# Patient Record
Sex: FEMALE | Race: WHITE | NOT HISPANIC OR LATINO | Employment: OTHER | URBAN - METROPOLITAN AREA
[De-identification: names, ages, dates, MRNs, and addresses within clinical notes are randomized per-mention and may not be internally consistent; named-entity substitution may affect disease eponyms.]

---

## 2023-05-26 DIAGNOSIS — I10 ESSENTIAL (PRIMARY) HYPERTENSION: ICD-10-CM

## 2023-05-26 PROBLEM — Z86.0100 HISTORY OF COLON POLYPS: Status: ACTIVE | Noted: 2023-05-26

## 2023-05-26 PROBLEM — E78.2 COMBINED HYPERLIPIDEMIA: Status: ACTIVE | Noted: 2023-05-26

## 2023-05-26 PROBLEM — H65.02 ACUTE SEROUS OTITIS MEDIA OF LEFT EAR: Status: RESOLVED | Noted: 2023-05-26 | Resolved: 2023-05-26

## 2023-05-26 PROBLEM — Z86.010 HISTORY OF COLON POLYPS: Status: ACTIVE | Noted: 2023-05-26

## 2023-05-26 PROBLEM — T78.2XXA ANAPHYLAXIS DUE TO INSECT VENOM: Status: ACTIVE | Noted: 2023-05-26

## 2023-05-26 PROBLEM — J02.9 SORE THROAT: Status: RESOLVED | Noted: 2023-05-26 | Resolved: 2023-05-26

## 2023-05-26 PROBLEM — T63.481A ANAPHYLAXIS DUE TO INSECT VENOM: Status: ACTIVE | Noted: 2023-05-26

## 2023-05-26 PROBLEM — R73.01 IMPAIRED FASTING GLUCOSE: Status: ACTIVE | Noted: 2023-05-26

## 2023-05-26 RX ORDER — LISINOPRIL AND HYDROCHLOROTHIAZIDE 10; 12.5 MG/1; MG/1
TABLET ORAL
Qty: 90 TABLET | Refills: 1 | Status: SHIPPED | OUTPATIENT
Start: 2023-05-26 | End: 2023-11-27

## 2023-05-26 RX ORDER — LATANOPROST 50 UG/ML
SOLUTION OPHTHALMIC
COMMUNITY

## 2023-05-26 RX ORDER — TIMOLOL MALEATE 5 MG/ML
SOLUTION/ DROPS OPHTHALMIC
COMMUNITY

## 2023-05-26 RX ORDER — LISINOPRIL 10 MG/1
10 TABLET ORAL DAILY
COMMUNITY
End: 2023-10-31

## 2023-05-26 RX ORDER — ASCORBIC ACID 500 MG
500 TABLET,CHEWABLE ORAL DAILY
COMMUNITY
Start: 2021-10-19

## 2023-05-26 RX ORDER — VIT C/E/ZN/COPPR/LUTEIN/ZEAXAN 250MG-90MG
25 CAPSULE ORAL DAILY
COMMUNITY
Start: 2021-10-19

## 2023-05-26 RX ORDER — LISINOPRIL AND HYDROCHLOROTHIAZIDE 10; 12.5 MG/1; MG/1
1 TABLET ORAL DAILY
COMMUNITY

## 2023-05-26 RX ORDER — SIMVASTATIN 40 MG/1
40 TABLET, FILM COATED ORAL DAILY
COMMUNITY
End: 2023-11-27

## 2023-05-26 RX ORDER — FLAXSEED OIL 1000 MG
1 CAPSULE ORAL DAILY
COMMUNITY

## 2023-06-26 ENCOUNTER — TELEPHONE (OUTPATIENT)
Dept: PRIMARY CARE | Facility: CLINIC | Age: 73
End: 2023-06-26
Payer: MEDICARE

## 2023-06-26 DIAGNOSIS — Z12.31 ENCOUNTER FOR SCREENING MAMMOGRAM FOR MALIGNANT NEOPLASM OF BREAST: ICD-10-CM

## 2023-06-26 NOTE — TELEPHONE ENCOUNTER
Pt is asking for an order for a Mammogram. Pt would like to  at office at the end of this week. Please call pt when ready.

## 2023-08-15 ENCOUNTER — OFFICE VISIT (OUTPATIENT)
Dept: PRIMARY CARE | Facility: CLINIC | Age: 73
End: 2023-08-15
Payer: MEDICARE

## 2023-08-15 VITALS
DIASTOLIC BLOOD PRESSURE: 78 MMHG | HEART RATE: 75 BPM | SYSTOLIC BLOOD PRESSURE: 118 MMHG | OXYGEN SATURATION: 96 % | BODY MASS INDEX: 21.67 KG/M2 | WEIGHT: 126.25 LBS

## 2023-08-15 DIAGNOSIS — I10 ESSENTIAL HYPERTENSION: ICD-10-CM

## 2023-08-15 DIAGNOSIS — L25.5 RHUS DERMATITIS: Primary | ICD-10-CM

## 2023-08-15 PROCEDURE — 1159F MED LIST DOCD IN RCRD: CPT | Performed by: FAMILY MEDICINE

## 2023-08-15 PROCEDURE — 1036F TOBACCO NON-USER: CPT | Performed by: FAMILY MEDICINE

## 2023-08-15 PROCEDURE — 3078F DIAST BP <80 MM HG: CPT | Performed by: FAMILY MEDICINE

## 2023-08-15 PROCEDURE — 3074F SYST BP LT 130 MM HG: CPT | Performed by: FAMILY MEDICINE

## 2023-08-15 PROCEDURE — 1160F RVW MEDS BY RX/DR IN RCRD: CPT | Performed by: FAMILY MEDICINE

## 2023-08-15 PROCEDURE — 99213 OFFICE O/P EST LOW 20 MIN: CPT | Performed by: FAMILY MEDICINE

## 2023-08-15 RX ORDER — PREDNISONE 10 MG/1
TABLET ORAL
Qty: 14 TABLET | Refills: 0 | Status: SHIPPED | OUTPATIENT
Start: 2023-08-15 | End: 2023-08-23

## 2023-08-15 ASSESSMENT — ENCOUNTER SYMPTOMS
APPETITE CHANGE: 0
UNEXPECTED WEIGHT CHANGE: 0
NAUSEA: 0

## 2023-08-15 NOTE — PROGRESS NOTES
Subjective   Patient ID: Krupa Ramirez is a 73 y.o. female who presents for Poison Ivy (Pt presentsw c/o poison ivy face, arm L and R wrist and finger x 2 days.BL).    Poison Serena       Has blisters and poison ivy rash over arms forearms and face but also has a swollen lip  Did take some Benadryl and applied hydrocortisone yesterday but it did not do much  Review of Systems   Constitutional:  Negative for appetite change and unexpected weight change.   Eyes:  Negative for visual disturbance.   Gastrointestinal:  Negative for nausea.       Objective   /78   Pulse 75   Wt 57.3 kg (126 lb 4 oz)   LMP  (LMP Unknown)   SpO2 96%   BMI 21.67 kg/m²     Physical Exam  HENT:      Head: Normocephalic and atraumatic.      Nose: Nose normal.      Mouth/Throat:      Mouth: Mucous membranes are moist.      Pharynx: No oropharyngeal exudate.   Eyes:      Extraocular Movements: Extraocular movements intact.      Conjunctiva/sclera: Conjunctivae normal.      Pupils: Pupils are equal, round, and reactive to light.   Cardiovascular:      Rate and Rhythm: Normal rate and regular rhythm.   Pulmonary:      Effort: Pulmonary effort is normal.   Abdominal:      General: There is no distension.      Palpations: Abdomen is soft.   Musculoskeletal:      Cervical back: Normal range of motion and neck supple.   Lymphadenopathy:      Cervical: No cervical adenopathy.   Neurological:      General: No focal deficit present.      Mental Status: She is alert.   Psychiatric:         Attention and Perception: Attention normal.         Speech: Speech normal.         Behavior: Behavior is cooperative.     Integumentary: Multiple areas of linear erythematous pruritic papules and vesicles over forearms arms and right face  The upper lip is quite swollen the lower lip less so but there is some poison ivy right on the vermilion border    Assessment/Plan   Diagnoses and all orders for this visit:  Rhus dermatitis  Comments:  Avoidance and local care  discussed   prescription given  Orders:  -     predniSONE (Deltasone) 10 mg tablet; Take 1 tablet (10 mg) by mouth 3 times a day for 2 days, THEN 1 tablet (10 mg) 2 times a day for 2 days, THEN 1 tablet (10 mg) once daily for 4 days.  Essential hypertension  Comments:  Controlled but question angioedema from ACE inhibitor versus poison ivy  Patient does not want to stop lisinopril and understands this angioedema may result in life-threatening swelling if it is related to the lisinopril  She is to follow her clinical situation very closely  RTC 1 week if not better sooner if worse  If develops swelling of the lips tongue throat or trouble breathing go to the emergency room immediately/call [phone]

## 2023-10-31 DIAGNOSIS — I10 ESSENTIAL (PRIMARY) HYPERTENSION: ICD-10-CM

## 2023-10-31 PROBLEM — D18.01 HEMANGIOMA OF SKIN AND SUBCUTANEOUS TISSUE: Status: ACTIVE | Noted: 2023-05-12

## 2023-10-31 PROBLEM — L91.8 OTHER HYPERTROPHIC DISORDERS OF THE SKIN: Status: ACTIVE | Noted: 2023-05-12

## 2023-10-31 PROBLEM — D22.70 MELANOCYTIC NEVI OF UNSPECIFIED LOWER LIMB, INCLUDING HIP: Status: ACTIVE | Noted: 2023-05-12

## 2023-10-31 PROBLEM — L90.5 SCAR CONDITION AND FIBROSIS OF SKIN: Status: ACTIVE | Noted: 2023-05-12

## 2023-10-31 PROBLEM — D22.5 MELANOCYTIC NEVI OF TRUNK: Status: ACTIVE | Noted: 2023-05-12

## 2023-10-31 PROBLEM — D22.60 MELANOCYTIC NEVI OF UNSPECIFIED UPPER LIMB, INCLUDING SHOULDER: Status: ACTIVE | Noted: 2023-05-12

## 2023-10-31 PROBLEM — L81.4 OTHER MELANIN HYPERPIGMENTATION: Status: ACTIVE | Noted: 2023-05-12

## 2023-10-31 PROBLEM — L82.1 OTHER SEBORRHEIC KERATOSIS: Status: ACTIVE | Noted: 2023-05-12

## 2023-10-31 RX ORDER — LISINOPRIL 10 MG/1
10 TABLET ORAL DAILY
Qty: 90 TABLET | Refills: 3 | Status: SHIPPED | OUTPATIENT
Start: 2023-10-31

## 2023-11-17 ENCOUNTER — APPOINTMENT (OUTPATIENT)
Dept: RADIOLOGY | Facility: HOSPITAL | Age: 73
End: 2023-11-17
Payer: MEDICARE

## 2023-11-17 ENCOUNTER — HOSPITAL ENCOUNTER (EMERGENCY)
Facility: HOSPITAL | Age: 73
Discharge: HOME | End: 2023-11-17
Attending: STUDENT IN AN ORGANIZED HEALTH CARE EDUCATION/TRAINING PROGRAM
Payer: MEDICARE

## 2023-11-17 VITALS
BODY MASS INDEX: 23.34 KG/M2 | HEIGHT: 64 IN | TEMPERATURE: 97.5 F | DIASTOLIC BLOOD PRESSURE: 90 MMHG | SYSTOLIC BLOOD PRESSURE: 147 MMHG | OXYGEN SATURATION: 97 % | WEIGHT: 136.69 LBS | RESPIRATION RATE: 16 BRPM | HEART RATE: 80 BPM

## 2023-11-17 DIAGNOSIS — M19.90 OSTEOARTHRITIS, UNSPECIFIED OSTEOARTHRITIS TYPE, UNSPECIFIED SITE: ICD-10-CM

## 2023-11-17 DIAGNOSIS — M25.562 ACUTE PAIN OF LEFT KNEE: Primary | ICD-10-CM

## 2023-11-17 PROCEDURE — 73564 X-RAY EXAM KNEE 4 OR MORE: CPT | Mod: LEFT SIDE | Performed by: RADIOLOGY

## 2023-11-17 PROCEDURE — 73564 X-RAY EXAM KNEE 4 OR MORE: CPT | Mod: LT,FY

## 2023-11-17 PROCEDURE — 99283 EMERGENCY DEPT VISIT LOW MDM: CPT | Mod: 25

## 2023-11-17 PROCEDURE — 99285 EMERGENCY DEPT VISIT HI MDM: CPT | Mod: 25 | Performed by: STUDENT IN AN ORGANIZED HEALTH CARE EDUCATION/TRAINING PROGRAM

## 2023-11-17 RX ORDER — NAPROXEN 500 MG/1
500 TABLET ORAL
Qty: 30 TABLET | Refills: 0 | Status: SHIPPED | OUTPATIENT
Start: 2023-11-17 | End: 2023-12-02

## 2023-11-17 RX ORDER — ACETAMINOPHEN 325 MG/1
650 TABLET ORAL ONCE
Status: COMPLETED | OUTPATIENT
Start: 2023-11-17 | End: 2023-11-17

## 2023-11-17 RX ADMIN — ACETAMINOPHEN 650 MG: 325 TABLET ORAL at 01:30

## 2023-11-17 ASSESSMENT — PAIN DESCRIPTION - PAIN TYPE: TYPE: ACUTE PAIN

## 2023-11-17 ASSESSMENT — COLUMBIA-SUICIDE SEVERITY RATING SCALE - C-SSRS
6. HAVE YOU EVER DONE ANYTHING, STARTED TO DO ANYTHING, OR PREPARED TO DO ANYTHING TO END YOUR LIFE?: NO
1. IN THE PAST MONTH, HAVE YOU WISHED YOU WERE DEAD OR WISHED YOU COULD GO TO SLEEP AND NOT WAKE UP?: NO
2. HAVE YOU ACTUALLY HAD ANY THOUGHTS OF KILLING YOURSELF?: NO

## 2023-11-17 ASSESSMENT — PAIN SCALES - GENERAL: PAINLEVEL_OUTOF10: 4

## 2023-11-17 ASSESSMENT — LIFESTYLE VARIABLES
REASON UNABLE TO ASSESS: NO
HAVE PEOPLE ANNOYED YOU BY CRITICIZING YOUR DRINKING: NO
HAVE YOU EVER FELT YOU SHOULD CUT DOWN ON YOUR DRINKING: NO
EVER FELT BAD OR GUILTY ABOUT YOUR DRINKING: NO
EVER HAD A DRINK FIRST THING IN THE MORNING TO STEADY YOUR NERVES TO GET RID OF A HANGOVER: NO

## 2023-11-17 ASSESSMENT — PAIN - FUNCTIONAL ASSESSMENT: PAIN_FUNCTIONAL_ASSESSMENT: 0-10

## 2023-11-17 NOTE — ED PROVIDER NOTES
History/Exam limitations: none  HPI was provided by patient    HPI:    Chief Complaint   Patient presents with    Knee Injury     Pt c/o left knee pain that started tonight private residence after stepping down a step.        Krupa Ramirez is a 73 y.o. female presents with chief complaint of knee pain.  Patient has pain in the left knee that started tonight after stepping down a stair house as she has a family room not a level below her first floor.  States pain is on the left knee on the lateral aspect.  She is unsure if her knee went for 2 months but pain reviewed reproduced with lateral movements.  Most pain to the lateral aspect just behind the left knee.  No swelling or ecchymosis and did not hit on anything.  She took ibuprofen prior to arrival with minor improvement.  She was able to ambulate here without much difficulty.  Considered.  A few hours prior to arrival.      presenting chief complaint of right knee pain.  Patient's complaints have been constant without any alleviating or exacerbating factors.    ROS: All other review of systems are negative except as noted above and HPI or ROS.   CONSTITUTIONAL: fever, chills  CARDIOVASCULAR: chest pain, palpitations, swelling  RESPIRATORY: cough, wheeze, shortness of breath  MUSCULOSKELETAL: deformity, neck pain, knee pain  SKIN: rash, color change  NEUROLOGIC: numbness, focal weakness  NOTES: All systems reviewed, negative except as described above       Physical Exam:  GENERAL: Alert, oriented , cooperative,  in no acute distress.  HEAD: normocephalic, atraumatic  SKIN: Intact,  dry skin, no lesions.  PULMONARY: Clear bilaterally. No crackles, rhonchi, wheezing.  No respiratory distress.  No work of breathing.  CARDIAC: Regular rate and rhythm.  Pulses 2+ and radials.  No murmur, rub.  MUSCULOSKELETAL: Full range of motion throughout, no deformity.   L knee without erythema, swelling, crepitus,   + pain with active and passive range of motion.   Tenderness  palpation to the left lateral posterior aspect of knee she was able to ambulate here on the knee with    No obvious deformity above or below the knee or pain with palpation to ankle.  No laxity to the joint or joint line TTP.   no tenderness to patella.    Compartments soft  NEUROLOGICAL:  no focal neuro deficits.  Exam limited secondary to pain elicited with passive and active movement.  Neurovascular intact distal to injury  PSYCHIATRIC: Appropriate mood and affect. Calm.       MDM/ED COURSE:    The patient presented for evaluation knee pain.  Differential includes but not limited to fracture, sprain, strain, dislocation.  Imaging studies if performed were independently reviewed and interpreted by myself and confirmed by radiologist.  Osteoarthritis on imaging.  Given of the soft tissue injury will give knee immobilizer to go home with along with crutches.  Also given naproxen for breakthrough pain to take as needed.  She is given Tylenol and ice here with some improvement to her symptoms.  Plan to be discharged home.     The patient does not meet criteria for admission and has stable vs and will be discharge home.   The patient and caregiver are in agreement with the plan and given instructions to follow up with Orthopedic surgery on outpatient basis             Note: This note was dictated by speech recognition. Minor errors in transcription may be present.          Past Medical History:   Diagnosis Date    Abnormal electrocardiogram (ECG) (EKG)     Abnormal EKG    Acute serous otitis media of left ear 05/26/2023    Other diseases of salivary glands     Parotid mass    Personal history of other diseases of the digestive system     History of hemorrhoids    Personal history of other diseases of the musculoskeletal system and connective tissue     History of osteoarthritis    Personal history of other endocrine, nutritional and metabolic disease     History of hyperlipidemia    Personal history of other mental and  behavioral disorders     History of depression    Personal history of other specified conditions     History of headache    Plantar fascial fibromatosis     Plantar fasciitis    Sore throat 05/26/2023      Social History     Socioeconomic History    Marital status:      Spouse name: None    Number of children: None    Years of education: None    Highest education level: None   Occupational History    None   Tobacco Use    Smoking status: Never    Smokeless tobacco: Never   Vaping Use    Vaping Use: Never used   Substance and Sexual Activity    Alcohol use: Yes     Comment: rarely    Drug use: Never    Sexual activity: None   Other Topics Concern    None   Social History Narrative    None     Social Determinants of Health     Financial Resource Strain: Not on file   Food Insecurity: Not on file   Transportation Needs: Not on file   Physical Activity: Not on file   Stress: Not on file   Social Connections: Not on file   Intimate Partner Violence: Not on file   Housing Stability: Not on file     Current Outpatient Medications   Medication Instructions    ascorbic acid (VITAMIN C) 500 mg, oral, Daily    cholecalciferol (VITAMIN D-3) 25 mcg, oral, Daily    fish oil concentrate (Omega-3) 120-180 mg capsule 1 g, oral, Daily    flaxseed oiL 1,000 mg capsule 1 capsule, oral, Daily    lisinopriL-hydrochlorothiazide 10-12.5 mg tablet TAKE 1 TABLET BY MOUTH EVERY DAY    lisinopriL-hydrochlorothiazide 10-12.5 mg tablet 1 tablet, oral, Daily    lisinopril 10 mg, oral, Daily    naproxen (NAPROSYN) 500 mg, oral, 2 times daily with meals, Take as needed for knee pain    simvastatin (ZOCOR) 40 mg, oral, Daily    timolol (Timoptic) 0.5 % ophthalmic solution INSTILL ONE DROP IN BOTH EYES EVERY DAY IN THE MORNING    Xalatan 0.005 % ophthalmic solution Xalatan 0.005 % Ophthalmic Solution   Refills: 0       Active     Allergies   Allergen Reactions    Amoxicillin Hives    Clomiphene Hives    Erythromycin Base Other     Sulfamethoxazole-Trimethoprim Hives    Other Rash     Artificial sweeteners cause red rash.    Sulfa (Sulfonamide Antibiotics) Rash             ED Triage Vitals [11/17/23 0101]   Temp Heart Rate Resp BP   36.4 °C (97.5 °F) 97 16 (!) 128/96      SpO2 Temp Source Heart Rate Source Patient Position   98 % Temporal Monitor Sitting      BP Location FiO2 (%)     Right arm --               Labs and Imaging  XR knee left 4+ views   Final Result   No acute osseous abnormality.   Mild tricompartmental osteoarthrosis.             MACRO:   None.        Signed by: Evan Finkelstein 11/17/2023 2:04 AM   Dictation workstation:   [id number]        Labs Reviewed - No data to display        Diagnoses as of 11/17/23 0211   Acute pain of left knee   Osteoarthritis, unspecified osteoarthritis type, unspecified site         XR knee left 4+ views   Final Result   No acute osseous abnormality.   Mild tricompartmental osteoarthrosis.             MACRO:   None.        Signed by: Evan Finkelstein 11/17/2023 2:04 AM   Dictation workstation:   [id number]        Labs Reviewed - No data to display        Procedure  Procedures         I discussed the differential, results and plan with the patient and/or family/friend/caregiver if present.       I emphasized the importance of follow-up with the physician I referred them to in the timeframe recommended.  I explained reasons for the patient to return to the Emergency Department. Additional verbal discharge instructions were also given and discussed with the patient to supplement those generated by the EMR. We also discussed medications that were prescribed (if any) including common side effects and interactions. The patient was advised to abstain from driving, operating heavy machinery or making significant decisions while taking medications such as opiates and muscle relaxers that may impair this. All questions were addressed.  They understand return precautions and discharge instructions. The  patient and/or family/friend/caregiver expressed understanding.     Note: This note was dictated by speech recognition. Minor errors in transcription may be present.           Dayday Alexis, DO  11/17/23 0213

## 2023-11-26 DIAGNOSIS — I10 ESSENTIAL (PRIMARY) HYPERTENSION: ICD-10-CM

## 2023-11-26 DIAGNOSIS — E78.2 MIXED HYPERLIPIDEMIA: ICD-10-CM

## 2023-11-27 RX ORDER — SIMVASTATIN 40 MG/1
40 TABLET, FILM COATED ORAL DAILY
Qty: 90 TABLET | Refills: 3 | Status: SHIPPED | OUTPATIENT
Start: 2023-11-27

## 2023-11-27 RX ORDER — LISINOPRIL AND HYDROCHLOROTHIAZIDE 10; 12.5 MG/1; MG/1
TABLET ORAL
Qty: 90 TABLET | Refills: 1 | Status: SHIPPED | OUTPATIENT
Start: 2023-11-27 | End: 2024-05-30

## 2023-12-13 ENCOUNTER — LAB (OUTPATIENT)
Dept: LAB | Facility: LAB | Age: 73
End: 2023-12-13
Payer: MEDICARE

## 2023-12-13 ENCOUNTER — OFFICE VISIT (OUTPATIENT)
Dept: PRIMARY CARE | Facility: CLINIC | Age: 73
End: 2023-12-13
Payer: MEDICARE

## 2023-12-13 VITALS
HEIGHT: 64 IN | BODY MASS INDEX: 21.21 KG/M2 | OXYGEN SATURATION: 98 % | DIASTOLIC BLOOD PRESSURE: 88 MMHG | WEIGHT: 124.2 LBS | SYSTOLIC BLOOD PRESSURE: 132 MMHG | HEART RATE: 80 BPM

## 2023-12-13 DIAGNOSIS — I10 ESSENTIAL HYPERTENSION: ICD-10-CM

## 2023-12-13 DIAGNOSIS — Z00.00 ROUTINE GENERAL MEDICAL EXAMINATION AT HEALTH CARE FACILITY: Primary | ICD-10-CM

## 2023-12-13 DIAGNOSIS — Z78.0 ASYMPTOMATIC MENOPAUSAL STATE: ICD-10-CM

## 2023-12-13 DIAGNOSIS — R73.03 PREDIABETES: ICD-10-CM

## 2023-12-13 DIAGNOSIS — E78.2 COMBINED HYPERLIPIDEMIA: ICD-10-CM

## 2023-12-13 LAB
ALBUMIN SERPL BCP-MCNC: 4.6 G/DL (ref 3.4–5)
ALP SERPL-CCNC: 55 U/L (ref 33–136)
ALT SERPL W P-5'-P-CCNC: 19 U/L (ref 7–45)
ANION GAP SERPL CALC-SCNC: 13 MMOL/L (ref 10–20)
AST SERPL W P-5'-P-CCNC: 22 U/L (ref 9–39)
BASOPHILS # BLD AUTO: 0.06 X10*3/UL (ref 0–0.1)
BASOPHILS NFR BLD AUTO: 1.1 %
BILIRUB SERPL-MCNC: 0.6 MG/DL (ref 0–1.2)
BUN SERPL-MCNC: 21 MG/DL (ref 6–23)
CALCIUM SERPL-MCNC: 10.2 MG/DL (ref 8.6–10.3)
CHLORIDE SERPL-SCNC: 98 MMOL/L (ref 98–107)
CHOLEST SERPL-MCNC: 208 MG/DL (ref 0–199)
CHOLESTEROL/HDL RATIO: 2.2
CO2 SERPL-SCNC: 30 MMOL/L (ref 21–32)
CREAT SERPL-MCNC: 0.75 MG/DL (ref 0.5–1.05)
EOSINOPHIL # BLD AUTO: 0.2 X10*3/UL (ref 0–0.4)
EOSINOPHIL NFR BLD AUTO: 3.5 %
ERYTHROCYTE [DISTWIDTH] IN BLOOD BY AUTOMATED COUNT: 13.8 % (ref 11.5–14.5)
GFR SERPL CREATININE-BSD FRML MDRD: 85 ML/MIN/1.73M*2
GLUCOSE SERPL-MCNC: 94 MG/DL (ref 74–99)
HCT VFR BLD AUTO: 45.3 % (ref 36–46)
HDLC SERPL-MCNC: 94.2 MG/DL
HGB BLD-MCNC: 14.5 G/DL (ref 12–16)
IMM GRANULOCYTES # BLD AUTO: 0.01 X10*3/UL (ref 0–0.5)
IMM GRANULOCYTES NFR BLD AUTO: 0.2 % (ref 0–0.9)
LDLC SERPL CALC-MCNC: 101 MG/DL
LYMPHOCYTES # BLD AUTO: 1.88 X10*3/UL (ref 0.8–3)
LYMPHOCYTES NFR BLD AUTO: 33 %
MCH RBC QN AUTO: 29.5 PG (ref 26–34)
MCHC RBC AUTO-ENTMCNC: 32 G/DL (ref 32–36)
MCV RBC AUTO: 92 FL (ref 80–100)
MONOCYTES # BLD AUTO: 0.4 X10*3/UL (ref 0.05–0.8)
MONOCYTES NFR BLD AUTO: 7 %
NEUTROPHILS # BLD AUTO: 3.14 X10*3/UL (ref 1.6–5.5)
NEUTROPHILS NFR BLD AUTO: 55.2 %
NON HDL CHOLESTEROL: 114 MG/DL (ref 0–149)
NRBC BLD-RTO: 0 /100 WBCS (ref 0–0)
PLATELET # BLD AUTO: 237 X10*3/UL (ref 150–450)
POC HEMOGLOBIN A1C: 5.8 % (ref 4.2–6.5)
POTASSIUM SERPL-SCNC: 4.1 MMOL/L (ref 3.5–5.3)
PROT SERPL-MCNC: 6.9 G/DL (ref 6.4–8.2)
RBC # BLD AUTO: 4.91 X10*6/UL (ref 4–5.2)
SODIUM SERPL-SCNC: 137 MMOL/L (ref 136–145)
TRIGL SERPL-MCNC: 64 MG/DL (ref 0–149)
VLDL: 13 MG/DL (ref 0–40)
WBC # BLD AUTO: 5.7 X10*3/UL (ref 4.4–11.3)

## 2023-12-13 PROCEDURE — 1160F RVW MEDS BY RX/DR IN RCRD: CPT | Performed by: FAMILY MEDICINE

## 2023-12-13 PROCEDURE — 3075F SYST BP GE 130 - 139MM HG: CPT | Performed by: FAMILY MEDICINE

## 2023-12-13 PROCEDURE — 85025 COMPLETE CBC W/AUTO DIFF WBC: CPT

## 2023-12-13 PROCEDURE — 36415 COLL VENOUS BLD VENIPUNCTURE: CPT

## 2023-12-13 PROCEDURE — 99214 OFFICE O/P EST MOD 30 MIN: CPT | Performed by: FAMILY MEDICINE

## 2023-12-13 PROCEDURE — 1170F FXNL STATUS ASSESSED: CPT | Performed by: FAMILY MEDICINE

## 2023-12-13 PROCEDURE — 83036 HEMOGLOBIN GLYCOSYLATED A1C: CPT | Performed by: FAMILY MEDICINE

## 2023-12-13 PROCEDURE — G0439 PPPS, SUBSEQ VISIT: HCPCS | Performed by: FAMILY MEDICINE

## 2023-12-13 PROCEDURE — 1036F TOBACCO NON-USER: CPT | Performed by: FAMILY MEDICINE

## 2023-12-13 PROCEDURE — 1125F AMNT PAIN NOTED PAIN PRSNT: CPT | Performed by: FAMILY MEDICINE

## 2023-12-13 PROCEDURE — 1159F MED LIST DOCD IN RCRD: CPT | Performed by: FAMILY MEDICINE

## 2023-12-13 PROCEDURE — 80053 COMPREHEN METABOLIC PANEL: CPT

## 2023-12-13 PROCEDURE — 80061 LIPID PANEL: CPT

## 2023-12-13 PROCEDURE — 3079F DIAST BP 80-89 MM HG: CPT | Performed by: FAMILY MEDICINE

## 2023-12-13 RX ORDER — ASPIRIN 81 MG/1
81 TABLET ORAL DAILY
COMMUNITY

## 2023-12-13 ASSESSMENT — ACTIVITIES OF DAILY LIVING (ADL)
TAKING_MEDICATION: INDEPENDENT
DOING_HOUSEWORK: INDEPENDENT
DRESSING: INDEPENDENT
MANAGING_FINANCES: INDEPENDENT
GROCERY_SHOPPING: INDEPENDENT
BATHING: INDEPENDENT

## 2023-12-13 ASSESSMENT — PATIENT HEALTH QUESTIONNAIRE - PHQ9
SUM OF ALL RESPONSES TO PHQ9 QUESTIONS 1 AND 2: 0
1. LITTLE INTEREST OR PLEASURE IN DOING THINGS: NOT AT ALL
2. FEELING DOWN, DEPRESSED OR HOPELESS: NOT AT ALL

## 2023-12-13 ASSESSMENT — ENCOUNTER SYMPTOMS
NAUSEA: 0
LOSS OF SENSATION IN FEET: 0
OCCASIONAL FEELINGS OF UNSTEADINESS: 0
DEPRESSION: 0
UNEXPECTED WEIGHT CHANGE: 0
APPETITE CHANGE: 0

## 2023-12-13 NOTE — PROGRESS NOTES
"Subjective   Patient ID: Krupa Ramirez is a 73 y.o. female who presents for Medicare Annual Wellness Visit Initial.    HPI   Patient has hx of stable hypertension, hyperlipidemia.  Pt denies chest pain, shortness of breath and edema.  Patient's current treatment as listed in Rx.  Patient is compliant with treatment and complains of no side effects associated treatment.    Review of Systems   Constitutional:  Negative for appetite change and unexpected weight change.   Eyes:  Negative for visual disturbance.   Gastrointestinal:  Negative for nausea.       Objective   /88   Pulse 80   Ht 1.626 m (5' 4\")   Wt 56.3 kg (124 lb 3.2 oz)   LMP  (LMP Unknown)   SpO2 98%   BMI 21.32 kg/m²     Physical Exam  HENT:      Head: Normocephalic and atraumatic.      Nose: Nose normal.      Mouth/Throat:      Mouth: Mucous membranes are moist.      Pharynx: No oropharyngeal exudate.   Eyes:      Extraocular Movements: Extraocular movements intact.      Conjunctiva/sclera: Conjunctivae normal.      Pupils: Pupils are equal, round, and reactive to light.   Cardiovascular:      Rate and Rhythm: Normal rate and regular rhythm.   Pulmonary:      Effort: Pulmonary effort is normal.   Abdominal:      General: There is no distension.      Palpations: Abdomen is soft.   Musculoskeletal:      Cervical back: Normal range of motion and neck supple.   Lymphadenopathy:      Cervical: No cervical adenopathy.   Neurological:      General: No focal deficit present.      Mental Status: She is alert.   Psychiatric:         Attention and Perception: Attention normal.         Speech: Speech normal.         Behavior: Behavior is cooperative.         Assessment/Plan   Diagnoses and all orders for this visit:  Routine general medical examination at health care facility  Prediabetes  -     POCT glycosylated hemoglobin (Hb A1C) manually resulted  Essential hypertension  Comments:  Treated and controlled  Orders:  -     Lipid Panel; Future  -     CBC " and Auto Differential; Future  -     Comprehensive Metabolic Panel; Future  Combined hyperlipidemia  Comments:  Continue statin  Low-fat low-cholesterol diet and regular activity discussed  Orders:  -     Lipid Panel; Future  -     CBC and Auto Differential; Future  -     Comprehensive Metabolic Panel; Future  Asymptomatic menopausal state  -     XR DEXA bone density; Future

## 2024-03-14 ENCOUNTER — APPOINTMENT (OUTPATIENT)
Dept: RADIOLOGY | Facility: HOSPITAL | Age: 74
End: 2024-03-14
Payer: MEDICARE

## 2024-03-20 ENCOUNTER — HOSPITAL ENCOUNTER (OUTPATIENT)
Dept: RADIOLOGY | Facility: HOSPITAL | Age: 74
Discharge: HOME | End: 2024-03-20
Payer: MEDICARE

## 2024-03-20 DIAGNOSIS — Z78.0 ASYMPTOMATIC MENOPAUSAL STATE: ICD-10-CM

## 2024-03-20 PROCEDURE — 77080 DXA BONE DENSITY AXIAL: CPT

## 2024-05-30 DIAGNOSIS — I10 ESSENTIAL (PRIMARY) HYPERTENSION: ICD-10-CM

## 2024-05-30 RX ORDER — LISINOPRIL AND HYDROCHLOROTHIAZIDE 10; 12.5 MG/1; MG/1
TABLET ORAL
Qty: 90 TABLET | Refills: 1 | Status: SHIPPED | OUTPATIENT
Start: 2024-05-30

## 2024-07-01 ENCOUNTER — TELEPHONE (OUTPATIENT)
Dept: PRIMARY CARE | Facility: CLINIC | Age: 74
End: 2024-07-01

## 2024-07-01 DIAGNOSIS — Z12.39 BREAST SCREENING: Primary | ICD-10-CM

## 2024-07-01 DIAGNOSIS — Z12.31 ENCOUNTER FOR SCREENING MAMMOGRAM FOR MALIGNANT NEOPLASM OF BREAST: ICD-10-CM

## 2024-07-01 NOTE — TELEPHONE ENCOUNTER
Pt is requesting an order for a mammogram. PT states she forgot to ask for the order at her last visit. 12/23/2023

## 2024-07-16 ENCOUNTER — APPOINTMENT (OUTPATIENT)
Dept: DERMATOLOGY | Facility: CLINIC | Age: 74
End: 2024-07-16
Payer: MEDICARE

## 2024-07-16 DIAGNOSIS — D22.9 BENIGN NEVUS: ICD-10-CM

## 2024-07-16 DIAGNOSIS — L81.4 LENTIGO: ICD-10-CM

## 2024-07-16 DIAGNOSIS — D18.01 ANGIOMA OF SKIN: Primary | ICD-10-CM

## 2024-07-16 DIAGNOSIS — L90.5 SCAR CONDITIONS AND FIBROSIS OF SKIN: ICD-10-CM

## 2024-07-16 DIAGNOSIS — L82.1 SEBORRHEIC KERATOSIS: ICD-10-CM

## 2024-07-16 DIAGNOSIS — L57.8 SUN-DAMAGED SKIN: ICD-10-CM

## 2024-07-16 DIAGNOSIS — Z85.820 PERSONAL HISTORY OF MALIGNANT MELANOMA OF SKIN: ICD-10-CM

## 2024-07-16 PROCEDURE — 1160F RVW MEDS BY RX/DR IN RCRD: CPT | Performed by: NURSE PRACTITIONER

## 2024-07-16 PROCEDURE — 1159F MED LIST DOCD IN RCRD: CPT | Performed by: NURSE PRACTITIONER

## 2024-07-16 PROCEDURE — 1036F TOBACCO NON-USER: CPT | Performed by: NURSE PRACTITIONER

## 2024-07-16 PROCEDURE — G2211 COMPLEX E/M VISIT ADD ON: HCPCS | Performed by: NURSE PRACTITIONER

## 2024-07-16 PROCEDURE — 99213 OFFICE O/P EST LOW 20 MIN: CPT | Performed by: NURSE PRACTITIONER

## 2024-07-16 NOTE — PATIENT INSTRUCTIONS

## 2024-07-16 NOTE — PROGRESS NOTES
Subjective     Krupa Ramirez is a 73 y.o. female who presents for the following: Skin Check.     Review of Systems:  No other skin or systemic complaints other than what is documented elsewhere in the note.    The following portions of the chart were reviewed this encounter and updated as appropriate:  Tobacco  Allergies  Meds  Problems  Med Hx  Surg Hx       Skin Cancer History  No skin cancer on file.    Specialty Problems          Dermatology Problems    H/O malignant melanoma of skin    Hemangioma of skin and subcutaneous tissue    Melanocytic nevi of trunk    Melanocytic nevi of unspecified lower limb, including hip    Melanocytic nevi of unspecified upper limb, including shoulder    Other hypertrophic disorders of the skin    Other melanin hyperpigmentation    Other seborrheic keratosis    Scar condition and fibrosis of skin     Past Medical History:  Krupa Ramirez  has a past medical history of Abnormal electrocardiogram (ECG) (EKG), Acute serous otitis media of left ear (05/26/2023), Melanoma (Multi), Other diseases of salivary glands, Personal history of other diseases of the digestive system, Personal history of other diseases of the musculoskeletal system and connective tissue, Personal history of other endocrine, nutritional and metabolic disease, Personal history of other mental and behavioral disorders, Personal history of other specified conditions, Plantar fascial fibromatosis, Sore throat (05/26/2023), and Squamous cell skin cancer.    Past Surgical History:  Krupa Ramirez  has a past surgical history that includes Other surgical history (02/09/2017); Colonoscopy (02/09/2017); Total abdominal hysterectomy (02/09/2017); and Skin cancer excision.    Family History:  Patient family history includes Cancer in her father.    Social History:  Krupa Ramirez  reports that she has never smoked. She has never used smokeless tobacco. She reports current alcohol use. She reports that she does not use  drugs.    Allergies:  Amoxicillin, Clomiphene, Erythromycin base, Sulfamethoxazole-trimethoprim, Other, and Sulfa (sulfonamide antibiotics)    Current Medications / CAM's:    Current Outpatient Medications:     ascorbic acid (Vitamin C) 500 mg chewable tablet, Chew 1 tablet (500 mg) once daily., Disp: , Rfl:     cholecalciferol (Vitamin D-3) 25 MCG (1000 UT) capsule, Take 1 capsule (25 mcg) by mouth once daily., Disp: , Rfl:     fish oil concentrate (Omega-3) 120-180 mg capsule, Take 1 capsule (1 g) by mouth once daily., Disp: , Rfl:     lisinopril 10 mg tablet, TAKE 1 TABLET BY MOUTH EVERY DAY, Disp: 90 tablet, Rfl: 3    lisinopriL-hydrochlorothiazide 10-12.5 mg tablet, TAKE 1 TABLET BY MOUTH EVERY DAY, Disp: 90 tablet, Rfl: 1    simvastatin (Zocor) 40 mg tablet, TAKE 1 TABLET BY MOUTH EVERY DAY, Disp: 90 tablet, Rfl: 3    timolol (Timoptic) 0.5 % ophthalmic solution, INSTILL ONE DROP IN BOTH EYES EVERY DAY IN THE MORNING, Disp: , Rfl:     Xalatan 0.005 % ophthalmic solution, Xalatan 0.005 % Ophthalmic Solution  Refills: 0     Active, Disp: , Rfl:      Objective   Well appearing patient in no apparent distress; mood and affect are within normal limits.    A full examination was performed including scalp, head, eyes, ears, nose, lips, neck, chest, axillae, abdomen, back, buttocks, bilateral upper extremities, bilateral lower extremities, hands, feet, fingers, toes, fingernails, and toenails. All findings within normal limits unless otherwise noted below.    Lymph Nodes  The following lymph node beds were examined: Preauricular. Postauricular. Submandib. Submental. Occipital. Cervical. Supraclav. Axillary. Epitroch. Inguinal. Popliteal.    The lymph node beds were examined bilaterally and no palpable adenopathy was noted.    Assessment/Plan   1. Angioma of skin  Scattered cherry-red papule(s).    A cherry hemangioma is a small macule (small, flat, smooth area) or papule (small, solid bump) formed from an overgrowth  of tiny blood vessels in the skin. Cherry hemangiomas are characteristically red or purplish in color. They often first appear in middle adulthood and usually increase in number with age. Cherry hemangiomas are noncancerous (benign) and are common in adults.    The present appearance of the lesion is not worrisome but it should continue to be observed and testing/treatment may be warranted if change occurs.    2. Benign nevus  Scattered, uniform and benign-appearing, regular brown melanocytic papules and macules.    The present appearance of the lesion is not worrisome but it should continue to be observed and testing/treatment may be warranted if change occurs.    3. Seborrheic keratosis  Stuck on verrucous, tan-brown papules and plaques.      Seborrheic keratoses are common noncancerous (benign) growths of unknown cause seen in adults due to a thickening of an area of the top skin layer. Seborrheic keratoses may appear as if they are stuck on to the skin. They have distinct borders, and they may appear as papules (small, solid bumps) or plaques (solid, raised patches that are bigger than a thumbnail). They may be the same color as your skin, or they may be pink, light brown, darker brown, or very dark brown, or sometimes may appear black.    There is no way to prevent new seborrheic keratoses from forming. Seborrheic keratoses can be removed, but removal is considered a cosmetic issue and is usually not covered by insurance.    PLAN  No treatment is needed unless there is irritation from clothing, such as itching or bleeding.  2.   Some lotions containing alpha hydroxy acids, salicylic acid, or urea may make the areas feel smoother with regular use but will not eliminate them.    4. Lentigo  Scattered tan macules in sun-exposed areas.    A solar lentigo (plural, solar lentigines), also known as a sun-induced freckle or senile lentigo, is a dark (hyperpigmented) lesion caused by natural or artificial ultraviolet  (UV) light. Solar lentigines may be single or multiple. This type of lentigo is different from a simple lentigo (lentigo simplex) because it is caused by exposure to UV light. Solar lentigines are benign, but they do indicate excessive sun exposure, a risk factor for the development of skin cancer.    To prevent solar lentigines, avoid exposure to sunlight in midday (10 AM to 3 PM), wear sun-protective clothing (tightly woven clothes and hats), and apply sunscreen (SPF 30 UVA and UVB block).    The present appearance of the lesion is not worrisome but it should continue to be observed and testing/treatment may be warranted if change occurs.    5. Scar conditions and fibrosis of skin  Right Upper Arm - Posterior  Well healed scar at the site(s) of prior treatment with no evidence of recurrence.          The scar is clear, there is no evidence of recurrence.  The present appearance of the scar is not worrisome but it should continue to be observed and testing/treatment may be warranted if change occurs.      6. Personal history of malignant melanoma of skin    ABCDEs of melanoma and atypical moles were discussed with the patient.    Patient was instructed to perform monthly self skin examination.  We recommended that the patient have regular full skin exams given an increased risk of subsequent skin cancers.    The patient was instructed to use sun protective behaviors including use of broad spectrum sunscreens and sun protective clothing to reduce risk of skin cancers.    Warning signs of non-melanoma skin cancer discussed.    7. Sun-damaged skin  Actinic changes in the form of freckles, lentigines and hyper/hypopigmentation     ABCDEs of melanoma and atypical moles were discussed with the patient.    Routine dental, ophtho, and gyn (if female) exams were recommended.    The patient was instructed to recommend that first degree relatives have yearly skin exams.    Patient was instructed to perform monthly self skin  examination.  We recommended that the patient have regular full skin exams given an increased risk of subsequent skin cancers.    The patient was instructed to use sun protective behaviors including use of broad spectrum sunscreens and sun protective clothing to reduce risk of skin cancers.    Warning signs of non-melanoma skin cancer discussed.    Up to date on dental and eye exams.            Patient has history of malignant melanoma. Follow up in 1 year for long-term monitoring for skin cancer recurrence and metastasis or sooner if needed.

## 2024-07-26 ENCOUNTER — HOSPITAL ENCOUNTER (OUTPATIENT)
Dept: RADIOLOGY | Facility: HOSPITAL | Age: 74
Discharge: HOME | End: 2024-07-26
Payer: MEDICARE

## 2024-07-26 VITALS — WEIGHT: 130 LBS | BODY MASS INDEX: 22.2 KG/M2 | HEIGHT: 64 IN

## 2024-07-26 DIAGNOSIS — Z12.31 ENCOUNTER FOR SCREENING MAMMOGRAM FOR MALIGNANT NEOPLASM OF BREAST: ICD-10-CM

## 2024-07-26 DIAGNOSIS — Z12.39 BREAST SCREENING: ICD-10-CM

## 2024-07-26 PROCEDURE — 77067 SCR MAMMO BI INCL CAD: CPT

## 2024-07-31 ENCOUNTER — HOSPITAL ENCOUNTER (OUTPATIENT)
Dept: RADIOLOGY | Facility: EXTERNAL LOCATION | Age: 74
Discharge: HOME | End: 2024-07-31

## 2024-08-02 ENCOUNTER — HOSPITAL ENCOUNTER (OUTPATIENT)
Dept: RADIOLOGY | Facility: HOSPITAL | Age: 74
Discharge: HOME | End: 2024-08-02
Payer: MEDICARE

## 2024-08-02 DIAGNOSIS — R92.8 ABNORMAL MAMMOGRAM: ICD-10-CM

## 2024-08-02 PROCEDURE — 77062 BREAST TOMOSYNTHESIS BI: CPT | Mod: RSC

## 2024-08-27 DIAGNOSIS — E78.2 MIXED HYPERLIPIDEMIA: ICD-10-CM

## 2024-08-28 RX ORDER — SIMVASTATIN 40 MG/1
40 TABLET, FILM COATED ORAL DAILY
Qty: 90 TABLET | Refills: 3 | Status: SHIPPED | OUTPATIENT
Start: 2024-08-28

## 2024-10-31 ENCOUNTER — TELEMEDICINE (OUTPATIENT)
Dept: PRIMARY CARE | Facility: CLINIC | Age: 74
End: 2024-10-31
Payer: MEDICARE

## 2024-10-31 DIAGNOSIS — U07.1 COVID: Primary | ICD-10-CM

## 2024-10-31 DIAGNOSIS — I10 ESSENTIAL (PRIMARY) HYPERTENSION: ICD-10-CM

## 2024-10-31 PROCEDURE — 1160F RVW MEDS BY RX/DR IN RCRD: CPT | Performed by: FAMILY MEDICINE

## 2024-10-31 PROCEDURE — 99213 OFFICE O/P EST LOW 20 MIN: CPT | Performed by: FAMILY MEDICINE

## 2024-10-31 PROCEDURE — 1036F TOBACCO NON-USER: CPT | Performed by: FAMILY MEDICINE

## 2024-10-31 PROCEDURE — 1159F MED LIST DOCD IN RCRD: CPT | Performed by: FAMILY MEDICINE

## 2024-10-31 RX ORDER — LISINOPRIL 10 MG/1
10 TABLET ORAL DAILY
Qty: 90 TABLET | Refills: 1 | Status: SHIPPED | OUTPATIENT
Start: 2024-10-31

## 2024-10-31 ASSESSMENT — ENCOUNTER SYMPTOMS
SORE THROAT: 1
COUGH: 1
HOARSE VOICE: 1

## 2024-12-02 DIAGNOSIS — I10 ESSENTIAL (PRIMARY) HYPERTENSION: ICD-10-CM

## 2024-12-03 RX ORDER — LISINOPRIL AND HYDROCHLOROTHIAZIDE 10; 12.5 MG/1; MG/1
TABLET ORAL
Qty: 90 TABLET | Refills: 0 | Status: SHIPPED | OUTPATIENT
Start: 2024-12-03

## 2025-01-08 ENCOUNTER — APPOINTMENT (OUTPATIENT)
Dept: PRIMARY CARE | Facility: CLINIC | Age: 75
End: 2025-01-08
Payer: MEDICARE

## 2025-01-22 ENCOUNTER — APPOINTMENT (OUTPATIENT)
Dept: PRIMARY CARE | Facility: CLINIC | Age: 75
End: 2025-01-22
Payer: MEDICARE

## 2025-02-07 PROBLEM — J32.9 SINUSITIS: Status: RESOLVED | Noted: 2025-02-07 | Resolved: 2025-02-07

## 2025-02-07 PROBLEM — K11.8 MASS OF PAROTID GLAND: Status: ACTIVE | Noted: 2025-02-07

## 2025-02-07 PROBLEM — Z86.59 HISTORY OF DEPRESSION: Status: ACTIVE | Noted: 2025-02-07

## 2025-02-07 PROBLEM — M72.2 PLANTAR FASCIITIS: Status: ACTIVE | Noted: 2025-02-07

## 2025-02-07 PROBLEM — R94.31 ABNORMAL ELECTROCARDIOGRAPHY: Status: ACTIVE | Noted: 2022-04-19

## 2025-02-07 PROBLEM — Z86.39 HISTORY OF ELEVATED LIPIDS: Status: ACTIVE | Noted: 2025-02-07

## 2025-02-11 ENCOUNTER — APPOINTMENT (OUTPATIENT)
Dept: PRIMARY CARE | Facility: CLINIC | Age: 75
End: 2025-02-11
Payer: MEDICARE

## 2025-02-11 VITALS
HEIGHT: 64 IN | HEART RATE: 96 BPM | WEIGHT: 128 LBS | DIASTOLIC BLOOD PRESSURE: 88 MMHG | TEMPERATURE: 97.9 F | SYSTOLIC BLOOD PRESSURE: 132 MMHG | OXYGEN SATURATION: 97 % | BODY MASS INDEX: 21.85 KG/M2

## 2025-02-11 DIAGNOSIS — Z00.00 ROUTINE GENERAL MEDICAL EXAMINATION AT HEALTH CARE FACILITY: ICD-10-CM

## 2025-02-11 DIAGNOSIS — E78.2 COMBINED HYPERLIPIDEMIA: ICD-10-CM

## 2025-02-11 DIAGNOSIS — I10 ESSENTIAL (PRIMARY) HYPERTENSION: ICD-10-CM

## 2025-02-11 DIAGNOSIS — E78.2 MIXED HYPERLIPIDEMIA: ICD-10-CM

## 2025-02-11 DIAGNOSIS — Z12.11 ENCOUNTER FOR SCREENING FOR MALIGNANT NEOPLASM OF COLON: ICD-10-CM

## 2025-02-11 DIAGNOSIS — R73.01 IMPAIRED FASTING GLUCOSE: ICD-10-CM

## 2025-02-11 DIAGNOSIS — I10 ESSENTIAL HYPERTENSION: Primary | ICD-10-CM

## 2025-02-11 PROCEDURE — 3079F DIAST BP 80-89 MM HG: CPT | Performed by: FAMILY MEDICINE

## 2025-02-11 PROCEDURE — 1123F ACP DISCUSS/DSCN MKR DOCD: CPT | Performed by: FAMILY MEDICINE

## 2025-02-11 PROCEDURE — G0439 PPPS, SUBSEQ VISIT: HCPCS | Performed by: FAMILY MEDICINE

## 2025-02-11 PROCEDURE — 3075F SYST BP GE 130 - 139MM HG: CPT | Performed by: FAMILY MEDICINE

## 2025-02-11 PROCEDURE — 1036F TOBACCO NON-USER: CPT | Performed by: FAMILY MEDICINE

## 2025-02-11 PROCEDURE — 1160F RVW MEDS BY RX/DR IN RCRD: CPT | Performed by: FAMILY MEDICINE

## 2025-02-11 PROCEDURE — 1159F MED LIST DOCD IN RCRD: CPT | Performed by: FAMILY MEDICINE

## 2025-02-11 PROCEDURE — 1170F FXNL STATUS ASSESSED: CPT | Performed by: FAMILY MEDICINE

## 2025-02-11 PROCEDURE — 3008F BODY MASS INDEX DOCD: CPT | Performed by: FAMILY MEDICINE

## 2025-02-11 RX ORDER — LISINOPRIL AND HYDROCHLOROTHIAZIDE 10; 12.5 MG/1; MG/1
1 TABLET ORAL DAILY
Qty: 90 TABLET | Refills: 3 | Status: SHIPPED | OUTPATIENT
Start: 2025-02-11

## 2025-02-11 RX ORDER — SIMVASTATIN 40 MG/1
40 TABLET, FILM COATED ORAL DAILY
Qty: 90 TABLET | Refills: 3 | Status: SHIPPED | OUTPATIENT
Start: 2025-02-11

## 2025-02-11 RX ORDER — LISINOPRIL 10 MG/1
10 TABLET ORAL DAILY
Qty: 90 TABLET | Refills: 3 | Status: SHIPPED | OUTPATIENT
Start: 2025-02-11

## 2025-02-11 ASSESSMENT — ACTIVITIES OF DAILY LIVING (ADL)
BATHING: INDEPENDENT
DOING_HOUSEWORK: INDEPENDENT
DRESSING: INDEPENDENT
GROCERY_SHOPPING: INDEPENDENT
MANAGING_FINANCES: INDEPENDENT
TAKING_MEDICATION: INDEPENDENT

## 2025-02-11 ASSESSMENT — PATIENT HEALTH QUESTIONNAIRE - PHQ9
1. LITTLE INTEREST OR PLEASURE IN DOING THINGS: NOT AT ALL
SUM OF ALL RESPONSES TO PHQ9 QUESTIONS 1 AND 2: 0
2. FEELING DOWN, DEPRESSED OR HOPELESS: NOT AT ALL

## 2025-02-11 ASSESSMENT — ENCOUNTER SYMPTOMS
OCCASIONAL FEELINGS OF UNSTEADINESS: 0
LOSS OF SENSATION IN FEET: 0
APPETITE CHANGE: 0
NAUSEA: 0
DEPRESSION: 0
UNEXPECTED WEIGHT CHANGE: 0

## 2025-02-11 NOTE — PROGRESS NOTES
"Subjective   Patient ID: Krupa Ramirez is a 74 y.o. female who presents for Medicare Annual Wellness Visit Subsequent (Krupa is here today for Medicare Annual Wellness visit. ).    HPI   Patient has hx of stable hypertension, hyperlipidemia.  Pt denies chest pain, shortness of breath and edema.  Patient's current treatment as listed in Rx.  Patient is compliant with treatment and complains of no side effects associated treatment.    Review of Systems   Constitutional:  Negative for appetite change and unexpected weight change.   Eyes:  Negative for visual disturbance.   Gastrointestinal:  Negative for nausea.       Objective   /88 (BP Location: Left arm, Patient Position: Sitting)   Pulse 96   Temp 36.6 °C (97.9 °F) (Temporal)   Ht 1.626 m (5' 4\")   Wt 58.1 kg (128 lb)   LMP  (LMP Unknown)   SpO2 97%   BMI 21.97 kg/m²     Physical Exam  HENT:      Head: Normocephalic and atraumatic.      Nose: Nose normal.      Mouth/Throat:      Mouth: Mucous membranes are moist.      Pharynx: No oropharyngeal exudate.   Eyes:      Extraocular Movements: Extraocular movements intact.      Conjunctiva/sclera: Conjunctivae normal.      Pupils: Pupils are equal, round, and reactive to light.   Cardiovascular:      Rate and Rhythm: Normal rate and regular rhythm.   Pulmonary:      Effort: Pulmonary effort is normal.      Breath sounds: Normal breath sounds.   Abdominal:      General: There is no distension.      Palpations: Abdomen is soft.   Musculoskeletal:      Cervical back: Normal range of motion and neck supple.   Lymphadenopathy:      Cervical: No cervical adenopathy.   Neurological:      General: No focal deficit present.      Mental Status: She is alert.   Psychiatric:         Attention and Perception: Attention normal.         Speech: Speech normal.         Behavior: Behavior is cooperative.         Assessment/Plan   Problem List Items Addressed This Visit             ICD-10-CM    Combined hyperlipidemia E78.2     " Continue statin  Low-fat low-cholesterol diet and stay active         Relevant Orders    Lipid Panel    CBC and Auto Differential    Comprehensive Metabolic Panel    Hemoglobin A1C    Essential hypertension - Primary I10     Treated and controlled         Relevant Orders    Lipid Panel    CBC and Auto Differential    Comprehensive Metabolic Panel    Hemoglobin A1C    Impaired fasting glucose R73.01    Relevant Orders    Lipid Panel    CBC and Auto Differential    Comprehensive Metabolic Panel    Hemoglobin A1C     Other Visit Diagnoses         Codes    Encounter for screening for malignant neoplasm of colon     Z12.11    Relevant Orders    Colonoscopy Screening; Average Risk Patient    Essential (primary) hypertension     I10    Relevant Medications    lisinopril 10 mg tablet    lisinopriL-hydrochlorothiazide 10-12.5 mg tablet    Mixed hyperlipidemia     E78.2    Relevant Medications    simvastatin (Zocor) 40 mg tablet    Routine general medical examination at health care facility     Z00.00    Relevant Orders    1 Year Follow Up In Primary Care - Wellness Exam        Reviewed labs and mammogram report  LO BRAXTON discussed  Recheck 6 to 12 months sooner if any issues arise

## 2025-02-12 LAB
ALBUMIN SERPL-MCNC: 4.7 G/DL (ref 3.6–5.1)
ALP SERPL-CCNC: 51 U/L (ref 37–153)
ALT SERPL-CCNC: 14 U/L (ref 6–29)
ANION GAP SERPL CALCULATED.4IONS-SCNC: 10 MMOL/L (CALC) (ref 7–17)
AST SERPL-CCNC: 18 U/L (ref 10–35)
BASOPHILS # BLD AUTO: 50 CELLS/UL (ref 0–200)
BASOPHILS NFR BLD AUTO: 0.8 %
BILIRUB SERPL-MCNC: 0.6 MG/DL (ref 0.2–1.2)
BUN SERPL-MCNC: 16 MG/DL (ref 7–25)
CALCIUM SERPL-MCNC: 10.3 MG/DL (ref 8.6–10.4)
CHLORIDE SERPL-SCNC: 99 MMOL/L (ref 98–110)
CHOLEST SERPL-MCNC: 201 MG/DL
CHOLEST/HDLC SERPL: 2.1 (CALC)
CO2 SERPL-SCNC: 29 MMOL/L (ref 20–32)
CREAT SERPL-MCNC: 0.87 MG/DL (ref 0.6–1)
EGFRCR SERPLBLD CKD-EPI 2021: 71 ML/MIN/1.73M2
EOSINOPHIL # BLD AUTO: 113 CELLS/UL (ref 15–500)
EOSINOPHIL NFR BLD AUTO: 1.8 %
ERYTHROCYTE [DISTWIDTH] IN BLOOD BY AUTOMATED COUNT: 12.7 % (ref 11–15)
EST. AVERAGE GLUCOSE BLD GHB EST-MCNC: 128 MG/DL
EST. AVERAGE GLUCOSE BLD GHB EST-SCNC: 7.1 MMOL/L
GLUCOSE SERPL-MCNC: 110 MG/DL (ref 65–99)
HBA1C MFR BLD: 6.1 % OF TOTAL HGB
HCT VFR BLD AUTO: 44.9 % (ref 35–45)
HDLC SERPL-MCNC: 96 MG/DL
HGB BLD-MCNC: 14.8 G/DL (ref 11.7–15.5)
LDLC SERPL CALC-MCNC: 87 MG/DL (CALC)
LYMPHOCYTES # BLD AUTO: 1840 CELLS/UL (ref 850–3900)
LYMPHOCYTES NFR BLD AUTO: 29.2 %
MCH RBC QN AUTO: 29.3 PG (ref 27–33)
MCHC RBC AUTO-ENTMCNC: 33 G/DL (ref 32–36)
MCV RBC AUTO: 88.9 FL (ref 80–100)
MONOCYTES # BLD AUTO: 410 CELLS/UL (ref 200–950)
MONOCYTES NFR BLD AUTO: 6.5 %
NEUTROPHILS # BLD AUTO: 3887 CELLS/UL (ref 1500–7800)
NEUTROPHILS NFR BLD AUTO: 61.7 %
NONHDLC SERPL-MCNC: 105 MG/DL (CALC)
PLATELET # BLD AUTO: 210 THOUSAND/UL (ref 140–400)
PMV BLD REES-ECKER: 11.4 FL (ref 7.5–12.5)
POTASSIUM SERPL-SCNC: 4.6 MMOL/L (ref 3.5–5.3)
PROT SERPL-MCNC: 7.1 G/DL (ref 6.1–8.1)
RBC # BLD AUTO: 5.05 MILLION/UL (ref 3.8–5.1)
SODIUM SERPL-SCNC: 138 MMOL/L (ref 135–146)
TRIGL SERPL-MCNC: 85 MG/DL
WBC # BLD AUTO: 6.3 THOUSAND/UL (ref 3.8–10.8)

## 2025-05-12 ENCOUNTER — APPOINTMENT (OUTPATIENT)
Dept: DERMATOLOGY | Facility: CLINIC | Age: 75
End: 2025-05-12
Payer: MEDICARE

## 2025-05-30 ENCOUNTER — OFFICE VISIT (OUTPATIENT)
Dept: PRIMARY CARE | Facility: CLINIC | Age: 75
End: 2025-05-30
Payer: MEDICARE

## 2025-05-30 VITALS
WEIGHT: 132 LBS | OXYGEN SATURATION: 97 % | BODY MASS INDEX: 22.53 KG/M2 | HEART RATE: 114 BPM | HEIGHT: 64 IN | TEMPERATURE: 97.7 F | SYSTOLIC BLOOD PRESSURE: 148 MMHG | DIASTOLIC BLOOD PRESSURE: 98 MMHG

## 2025-05-30 DIAGNOSIS — L25.5 RHUS DERMATITIS: Primary | ICD-10-CM

## 2025-05-30 DIAGNOSIS — I10 ESSENTIAL HYPERTENSION: ICD-10-CM

## 2025-05-30 PROCEDURE — 1160F RVW MEDS BY RX/DR IN RCRD: CPT | Performed by: FAMILY MEDICINE

## 2025-05-30 PROCEDURE — 3008F BODY MASS INDEX DOCD: CPT | Performed by: FAMILY MEDICINE

## 2025-05-30 PROCEDURE — 1036F TOBACCO NON-USER: CPT | Performed by: FAMILY MEDICINE

## 2025-05-30 PROCEDURE — 99213 OFFICE O/P EST LOW 20 MIN: CPT | Performed by: FAMILY MEDICINE

## 2025-05-30 PROCEDURE — 3077F SYST BP >= 140 MM HG: CPT | Performed by: FAMILY MEDICINE

## 2025-05-30 PROCEDURE — G2211 COMPLEX E/M VISIT ADD ON: HCPCS | Performed by: FAMILY MEDICINE

## 2025-05-30 PROCEDURE — 3080F DIAST BP >= 90 MM HG: CPT | Performed by: FAMILY MEDICINE

## 2025-05-30 PROCEDURE — 1159F MED LIST DOCD IN RCRD: CPT | Performed by: FAMILY MEDICINE

## 2025-05-30 RX ORDER — PREDNISONE 10 MG/1
TABLET ORAL
Qty: 14 TABLET | Refills: 0 | Status: SHIPPED | OUTPATIENT
Start: 2025-05-30 | End: 2025-06-07

## 2025-05-30 ASSESSMENT — ENCOUNTER SYMPTOMS
APPETITE CHANGE: 0
UNEXPECTED WEIGHT CHANGE: 0
NAUSEA: 0

## 2025-05-30 NOTE — PROGRESS NOTES
"Subjective   Patient ID: Krupa Ramirez is a 74 y.o. female who presents for Sick Visit (Krupa is here today for same day sick visit with \"rash\" on and around the right eye x2 days. Pt states that it is spreading and getting worse however denies pain. ).    HPI   Was weeding in beds at home and wonders if she got some poison ivy around her right eye which is red with a few blotches but not itchy    Review of Systems   Constitutional:  Negative for appetite change and unexpected weight change.   Eyes:  Negative for visual disturbance.   Gastrointestinal:  Negative for nausea.       Objective   BP (!) 148/98 (BP Location: Left arm, Patient Position: Sitting)   Pulse (!) 114   Temp 36.5 °C (97.7 °F) (Temporal)   Ht 1.626 m (5' 4\")   Wt 59.9 kg (132 lb)   LMP  (LMP Unknown)   SpO2 97%   BMI 22.66 kg/m²     Physical Exam  HENT:      Head: Normocephalic and atraumatic.      Nose: Nose normal.      Mouth/Throat:      Mouth: Mucous membranes are moist.      Pharynx: No oropharyngeal exudate.   Eyes:      Extraocular Movements: Extraocular movements intact.      Conjunctiva/sclera: Conjunctivae normal.      Pupils: Pupils are equal, round, and reactive to light.   Cardiovascular:      Rate and Rhythm: Normal rate and regular rhythm.   Pulmonary:      Effort: Pulmonary effort is normal.      Breath sounds: Normal breath sounds.   Abdominal:      General: There is no distension.      Palpations: Abdomen is soft.   Musculoskeletal:      Cervical back: Normal range of motion and neck supple.   Lymphadenopathy:      Cervical: No cervical adenopathy.   Neurological:      General: No focal deficit present.      Mental Status: She is alert.   Psychiatric:         Attention and Perception: Attention normal.         Speech: Speech normal.         Behavior: Behavior is cooperative.     3 plaques to are 1 cm diameter and 1 is 2 cm diameter with 2 in the upper lid area and 1 in the lower lid area of the right eye  Some vesicles very " small overlying    Assessment/Plan   Problem List Items Addressed This Visit           ICD-10-CM    Essential hypertension I10    Monitor          Other Visit Diagnoses         Codes      Rhus dermatitis    -  Primary L25.5    Relevant Medications    predniSONE (Deltasone) 10 mg tablet          Risk-benefit discussed and add medication as directed  Avoidance  Recheck 1 week if not better sooner if worse

## 2025-06-02 ENCOUNTER — APPOINTMENT (OUTPATIENT)
Dept: DERMATOLOGY | Facility: CLINIC | Age: 75
End: 2025-06-02
Payer: MEDICARE

## 2025-06-02 DIAGNOSIS — L81.4 LENTIGO: ICD-10-CM

## 2025-06-02 DIAGNOSIS — Z85.820 PERSONAL HISTORY OF MALIGNANT MELANOMA OF SKIN: ICD-10-CM

## 2025-06-02 DIAGNOSIS — L90.5 SCAR CONDITIONS AND FIBROSIS OF SKIN: Primary | ICD-10-CM

## 2025-06-02 DIAGNOSIS — D18.01 ANGIOMA OF SKIN: ICD-10-CM

## 2025-06-02 DIAGNOSIS — L57.8 SUN-DAMAGED SKIN: ICD-10-CM

## 2025-06-02 DIAGNOSIS — L23.89 OTHER ALLERGIC CONTACT DERMATITIS: ICD-10-CM

## 2025-06-02 DIAGNOSIS — L82.1 SEBORRHEIC KERATOSIS: ICD-10-CM

## 2025-06-02 DIAGNOSIS — D22.9 BENIGN NEVUS: ICD-10-CM

## 2025-06-02 PROCEDURE — 1036F TOBACCO NON-USER: CPT | Performed by: NURSE PRACTITIONER

## 2025-06-02 PROCEDURE — 1159F MED LIST DOCD IN RCRD: CPT | Performed by: NURSE PRACTITIONER

## 2025-06-02 PROCEDURE — 1160F RVW MEDS BY RX/DR IN RCRD: CPT | Performed by: NURSE PRACTITIONER

## 2025-06-02 PROCEDURE — 99213 OFFICE O/P EST LOW 20 MIN: CPT | Performed by: NURSE PRACTITIONER

## 2025-06-02 NOTE — Clinical Note
Faint pink scaly patches and a few papules on exam today. Photo from Friday showed urticarial papules and plaques to the right periorbital area in a brush type pattern.

## 2025-06-02 NOTE — PATIENT INSTRUCTIONS

## 2025-06-02 NOTE — Clinical Note
ABCDEs of melanoma and atypical moles were discussed with the patient.    Patient was instructed to perform monthly self skin examination.  We recommended that the patient have regular full skin exams given an increased risk of subsequent skin cancers.    The patient was instructed to use sun protective behaviors including use of broad spectrum sunscreens and sun protective clothing to reduce risk of skin cancers.    Warning signs of non-melanoma skin cancer discussed.    Right upper arm 2005.

## 2025-06-02 NOTE — Clinical Note
ABCDEs of melanoma and atypical moles were discussed with the patient.    Routine dental, ophtho, and gyn (if female) exams were recommended.    The patient was instructed to recommend that first degree relatives have yearly skin exams.    Patient was instructed to perform monthly self skin examination.  We recommended that the patient have regular full skin exams given an increased risk of subsequent skin cancers.    The patient was instructed to use sun protective behaviors including use of broad spectrum sunscreens and sun protective clothing to reduce risk of skin cancers.    Warning signs of non-melanoma skin cancer discussed.    Up to date on dental and eye exams.

## 2025-06-02 NOTE — Clinical Note
Was working in her garden/yard end of last week and developed rash on Friday. Was started on Prednisone per Dr. Swanson. Continue with prednisone taper as prescribed. Reviewed hx of possible allergy to aspartame. Advised not connected with current issue as aspartame intact would show up with more of a generalized pattern given ingested. She reports having an area of pink scaly itchy skin develop during the winter (January) after shoveling driveway. Advised not likely connected to current event although would highlight more hypersensitive skin (winter time consistent with irritant contact from cold and current break out likely 2/2 to poison ivy or other plan).

## 2025-06-02 NOTE — PROGRESS NOTES
Subjective     Krupa Ramirez is a 74 y.o. female who presents for the following: Skin Check.     Review of Systems:  No other skin or systemic complaints other than what is documented elsewhere in the note.    The following portions of the chart were reviewed this encounter and updated as appropriate:  Tobacco  Allergies  Meds  Problems  Med Hx  Surg Hx       Skin Cancer History  Biopsy Log Book  No skin cancers from Specimen Tracking.    Additional History    Specialty Problems          Dermatology Problems    H/O malignant melanoma of skin    Hemangioma of skin and subcutaneous tissue    Melanocytic nevi of trunk    Melanocytic nevi of unspecified lower limb, including hip    Melanocytic nevi of unspecified upper limb, including shoulder    Other hypertrophic disorders of the skin    Other melanin hyperpigmentation    Other seborrheic keratosis    Scar condition and fibrosis of skin     Past Medical History:  Krupa Ramirez  has a past medical history of Abnormal electrocardiogram (ECG) (EKG), Acute serous otitis media of left ear (05/26/2023), Arthritis, Glaucoma, Hypertension, Melanoma (Multi), Other diseases of salivary glands, Personal history of other diseases of the digestive system, Personal history of other diseases of the musculoskeletal system and connective tissue, Personal history of other endocrine, nutritional and metabolic disease, Personal history of other mental and behavioral disorders, Personal history of other specified conditions, Plantar fascial fibromatosis, Sinusitis (02/07/2025), Sore throat (05/26/2023), and Squamous cell skin cancer.    Past Surgical History:  Krupa Ramirez  has a past surgical history that includes Other surgical history (02/09/2017); Colonoscopy (02/09/2017); Total abdominal hysterectomy (02/09/2017); and Skin cancer excision.    Family History:  Patient family history includes Breast cancer in her father's sister and mother's sister; Cancer in her father.    Social  History:  Krupa Ramirez  reports that she has never smoked. She has never used smokeless tobacco. She reports current alcohol use. She reports that she does not use drugs.    Allergies:  Amoxicillin, Clomiphene, Erythromycin base, Sulfamethoxazole-trimethoprim, Other, and Sulfa (sulfonamide antibiotics)    Current Medications / CAM's:  Current Medications[1]     Objective   Well appearing patient in no apparent distress; mood and affect are within normal limits.    A full examination was performed including scalp, head, eyes, ears, nose, lips, neck, chest, axillae, abdomen, back, buttocks, bilateral upper extremities, bilateral lower extremities, hands, feet, fingers, toes, fingernails, and toenails. All findings within normal limits unless otherwise noted below.    Lymph Nodes  The following lymph node beds were examined: Preauricular. Postauricular. Submandib. Submental. Occipital. Cervical. Supraclav. Axillary. Epitroch. Inguinal. Popliteal.    The lymph node beds were examined bilaterally and no palpable adenopathy was noted.    Assessment/Plan   Skin Exam  1. SCAR CONDITIONS AND FIBROSIS OF SKIN  Right Upper Arm - Posterior  Well healed scar at the site(s) of prior treatment with no evidence of recurrence.        The scar is clear, there is no evidence of recurrence.  The present appearance of the scar is not worrisome but it should continue to be observed and testing/treatment may be warranted if change occurs.    2. PERSONAL HISTORY OF MALIGNANT MELANOMA OF SKIN  Generalized  ABCDEs of melanoma and atypical moles were discussed with the patient.    Patient was instructed to perform monthly self skin examination.  We recommended that the patient have regular full skin exams given an increased risk of subsequent skin cancers.    The patient was instructed to use sun protective behaviors including use of broad spectrum sunscreens and sun protective clothing to reduce risk of skin cancers.    Warning signs of  non-melanoma skin cancer discussed.    Right upper arm 2005.   3. ANGIOMA OF SKIN  Generalized  Scattered cherry-red papule(s).  A cherry hemangioma is a small macule (small, flat, smooth area) or papule (small, solid bump) formed from an overgrowth of tiny blood vessels in the skin. Cherry hemangiomas are characteristically red or purplish in color. They often first appear in middle adulthood and usually increase in number with age. Cherry hemangiomas are noncancerous (benign) and are common in adults.    The present appearance of the lesion is not worrisome but it should continue to be observed and testing/treatment may be warranted if change occurs.  4. BENIGN NEVUS  Generalized  Scattered, uniform and benign-appearing, regular brown melanocytic papules and macules.  The present appearance of the lesion is not worrisome but it should continue to be observed and testing/treatment may be warranted if change occurs.  5. SEBORRHEIC KERATOSIS  Generalized  Stuck on verrucous, tan-brown papules and plaques.    Seborrheic keratoses are common noncancerous (benign) growths of unknown cause seen in adults due to a thickening of an area of the top skin layer. Seborrheic keratoses may appear as if they are stuck on to the skin. They have distinct borders, and they may appear as papules (small, solid bumps) or plaques (solid, raised patches that are bigger than a thumbnail). They may be the same color as your skin, or they may be pink, light brown, darker brown, or very dark brown, or sometimes may appear black.    There is no way to prevent new seborrheic keratoses from forming. Seborrheic keratoses can be removed, but removal is considered a cosmetic issue and is usually not covered by insurance.    PLAN  No treatment is needed unless there is irritation from clothing, such as itching or bleeding.  2.   Some lotions containing alpha hydroxy acids, salicylic acid, or urea may make the areas feel smoother with regular use  but will not eliminate them.  6. LENTIGO  Generalized  Scattered tan macules in sun-exposed areas.  A solar lentigo (plural, solar lentigines), also known as a sun-induced freckle or senile lentigo, is a dark (hyperpigmented) lesion caused by natural or artificial ultraviolet (UV) light. Solar lentigines may be single or multiple. This type of lentigo is different from a simple lentigo (lentigo simplex) because it is caused by exposure to UV light. Solar lentigines are benign, but they do indicate excessive sun exposure, a risk factor for the development of skin cancer.    To prevent solar lentigines, avoid exposure to sunlight in midday (10 AM to 3 PM), wear sun-protective clothing (tightly woven clothes and hats), and apply sunscreen (SPF 30 UVA and UVB block).    The present appearance of the lesion is not worrisome but it should continue to be observed and testing/treatment may be warranted if change occurs.  7. SUN-DAMAGED SKIN  Generalized  Actinic changes in the form of freckles, lentigines and hyper/hypopigmentation   ABCDEs of melanoma and atypical moles were discussed with the patient.    Routine dental, ophtho, and gyn (if female) exams were recommended.    The patient was instructed to recommend that first degree relatives have yearly skin exams.    Patient was instructed to perform monthly self skin examination.  We recommended that the patient have regular full skin exams given an increased risk of subsequent skin cancers.    The patient was instructed to use sun protective behaviors including use of broad spectrum sunscreens and sun protective clothing to reduce risk of skin cancers.    Warning signs of non-melanoma skin cancer discussed.    Up to date on dental and eye exams.   8. OTHER ALLERGIC CONTACT DERMATITIS  Right periorbital area.  Faint pink scaly patches and a few papules on exam today. Photo from Friday showed urticarial papules and plaques to the right periorbital area in a brush type  pattern.   Was working in her garden/yard end of last week and developed rash on Friday. Was started on Prednisone per Dr. Swanson. Continue with prednisone taper as prescribed. Reviewed hx of possible allergy to aspartame. Advised not connected with current issue as aspartame intact would show up with more of a generalized pattern given ingested. She reports having an area of pink scaly itchy skin develop during the winter (January) after shoveling driveway. Advised not likely connected to current event although would highlight more hypersensitive skin (winter time consistent with irritant contact from cold and current break out likely 2/2 to poison ivy or other plan).        Patient has history of malignant melanoma. Follow up in 1 year for long-term monitoring for skin cancer recurrence and metastasis or sooner if needed.         [1]   Current Outpatient Medications:     ascorbic acid (Vitamin C) 500 mg chewable tablet, Chew 1 tablet (500 mg) once daily., Disp: , Rfl:     cholecalciferol (Vitamin D-3) 25 MCG (1000 UT) capsule, Take 1 capsule (25 mcg) by mouth once daily., Disp: , Rfl:     fish oil concentrate (Omega-3) 120-180 mg capsule, Take 1 capsule (1 g) by mouth once daily., Disp: , Rfl:     lisinopril 10 mg tablet, Take 1 tablet (10 mg) by mouth once daily., Disp: 90 tablet, Rfl: 3    lisinopriL-hydrochlorothiazide 10-12.5 mg tablet, Take 1 tablet by mouth once daily., Disp: 90 tablet, Rfl: 3    predniSONE (Deltasone) 10 mg tablet, Take 1 tablet (10 mg) by mouth 3 times a day for 2 days, THEN 1 tablet (10 mg) 2 times a day for 2 days, THEN 1 tablet (10 mg) once daily for 4 days., Disp: 14 tablet, Rfl: 0    simvastatin (Zocor) 40 mg tablet, Take 1 tablet (40 mg) by mouth once daily., Disp: 90 tablet, Rfl: 3    timolol (Timoptic) 0.5 % ophthalmic solution, INSTILL ONE DROP IN BOTH EYES EVERY DAY IN THE MORNING, Disp: , Rfl:     Xalatan 0.005 % ophthalmic solution, Xalatan 0.005 % Ophthalmic Solution   Refills: 0     Active, Disp: , Rfl:

## 2025-07-10 ENCOUNTER — OFFICE VISIT (OUTPATIENT)
Dept: PRIMARY CARE | Facility: CLINIC | Age: 75
End: 2025-07-10
Payer: MEDICARE

## 2025-07-10 VITALS
OXYGEN SATURATION: 97 % | SYSTOLIC BLOOD PRESSURE: 102 MMHG | TEMPERATURE: 98.3 F | BODY MASS INDEX: 22.53 KG/M2 | DIASTOLIC BLOOD PRESSURE: 62 MMHG | WEIGHT: 132 LBS | HEIGHT: 64 IN | HEART RATE: 83 BPM

## 2025-07-10 DIAGNOSIS — I10 ESSENTIAL HYPERTENSION: ICD-10-CM

## 2025-07-10 DIAGNOSIS — J01.00 ACUTE MAXILLARY SINUSITIS, RECURRENCE NOT SPECIFIED: Primary | ICD-10-CM

## 2025-07-10 PROCEDURE — 3008F BODY MASS INDEX DOCD: CPT | Performed by: FAMILY MEDICINE

## 2025-07-10 PROCEDURE — 99213 OFFICE O/P EST LOW 20 MIN: CPT | Performed by: FAMILY MEDICINE

## 2025-07-10 PROCEDURE — 3078F DIAST BP <80 MM HG: CPT | Performed by: FAMILY MEDICINE

## 2025-07-10 PROCEDURE — 1159F MED LIST DOCD IN RCRD: CPT | Performed by: FAMILY MEDICINE

## 2025-07-10 PROCEDURE — 1160F RVW MEDS BY RX/DR IN RCRD: CPT | Performed by: FAMILY MEDICINE

## 2025-07-10 PROCEDURE — 1036F TOBACCO NON-USER: CPT | Performed by: FAMILY MEDICINE

## 2025-07-10 PROCEDURE — 3074F SYST BP LT 130 MM HG: CPT | Performed by: FAMILY MEDICINE

## 2025-07-10 PROCEDURE — G2211 COMPLEX E/M VISIT ADD ON: HCPCS | Performed by: FAMILY MEDICINE

## 2025-07-10 RX ORDER — LEVOFLOXACIN 500 MG/1
500 TABLET, FILM COATED ORAL DAILY
Qty: 7 TABLET | Refills: 0 | Status: SHIPPED | OUTPATIENT
Start: 2025-07-10 | End: 2025-07-17

## 2025-07-10 RX ORDER — PREDNISONE 10 MG/1
TABLET ORAL
Qty: 14 TABLET | Refills: 0 | Status: SHIPPED | OUTPATIENT
Start: 2025-07-10 | End: 2025-07-18

## 2025-07-10 ASSESSMENT — ENCOUNTER SYMPTOMS
NAUSEA: 0
UNEXPECTED WEIGHT CHANGE: 0
APPETITE CHANGE: 0

## 2025-07-10 NOTE — PROGRESS NOTES
"Subjective   Patient ID: Krupa Ramirez is a 74 y.o. female who presents for Sick Visit (Krupa is here today for same day sick visit with c/o cough, ST x3 days. ).    HPI   Patient is seen in the office complaining of sinus congestion.  Complains of stuffed up head, drainage, cough.  Patient complains symptoms are moderate and patient feels mildly ill.  The symptoms are improved with OTC medication.  Patient also notes associated fatigue.  Patient says she feels its mostly in her lungs    Review of Systems   Constitutional:  Negative for appetite change and unexpected weight change.   Eyes:  Negative for visual disturbance.   Gastrointestinal:  Negative for nausea.       Objective   /62   Pulse 83   Temp 36.8 °C (98.3 °F) (Oral)   Ht 1.626 m (5' 4\")   Wt 59.9 kg (132 lb)   LMP  (LMP Unknown)   SpO2 97%   BMI 22.66 kg/m²     Physical Exam  HENT:      Head: Normocephalic and atraumatic.      Nose: Nose normal.      Mouth/Throat:      Mouth: Mucous membranes are moist.      Pharynx: No oropharyngeal exudate.   Eyes:      Extraocular Movements: Extraocular movements intact.      Conjunctiva/sclera: Conjunctivae normal.      Pupils: Pupils are equal, round, and reactive to light.   Cardiovascular:      Rate and Rhythm: Normal rate and regular rhythm.   Pulmonary:      Effort: Pulmonary effort is normal.      Breath sounds: Wheezing and rhonchi present.   Abdominal:      General: There is no distension.      Palpations: Abdomen is soft.   Musculoskeletal:      Cervical back: Normal range of motion and neck supple.   Lymphadenopathy:      Cervical: No cervical adenopathy.   Neurological:      General: No focal deficit present.      Mental Status: She is alert.   Psychiatric:         Attention and Perception: Attention normal.         Speech: Speech normal.         Behavior: Behavior is cooperative.         Assessment/Plan   Problem List Items Addressed This Visit           ICD-10-CM    Essential hypertension I10 "    Monitor          Other Visit Diagnoses         Codes      Acute maxillary sinusitis, recurrence not specified    -  Primary J01.00    Relevant Medications    predniSONE (Deltasone) 10 mg tablet    levoFLOXacin (Levaquin) 500 mg tablet        Risk-benefit discussed and add medication as directed  Supportive care  Recheck 1 week if not better sooner if worse  Consider chest x-ray

## 2025-07-11 ENCOUNTER — TELEPHONE (OUTPATIENT)
Dept: PRIMARY CARE | Facility: CLINIC | Age: 75
End: 2025-07-11
Payer: MEDICARE

## 2025-07-11 DIAGNOSIS — J01.00 ACUTE MAXILLARY SINUSITIS, RECURRENCE NOT SPECIFIED: Primary | ICD-10-CM

## 2025-07-11 RX ORDER — DOXYCYCLINE 100 MG/1
100 TABLET ORAL 2 TIMES DAILY
Qty: 20 TABLET | Refills: 0 | Status: SHIPPED | OUTPATIENT
Start: 2025-07-11 | End: 2025-07-21

## 2025-07-11 NOTE — TELEPHONE ENCOUNTER
PT was in 07/10/25 and has since picked up the prednisone and levofloxacin. The PT states that she discussed side effects of the Levofloxacin with Dr. COBRUN and has taken one pill since then but no longer feels comfortable taking the medication. PT states she is not currently experiencing side effects.    PT is requesting a possible alternative to take instead.

## 2025-07-25 ENCOUNTER — APPOINTMENT (OUTPATIENT)
Dept: DERMATOLOGY | Facility: CLINIC | Age: 75
End: 2025-07-25
Payer: MEDICARE

## 2025-07-26 ENCOUNTER — OFFICE VISIT (OUTPATIENT)
Dept: PRIMARY CARE | Facility: CLINIC | Age: 75
End: 2025-07-26
Payer: MEDICARE

## 2025-07-26 VITALS
BODY MASS INDEX: 22.2 KG/M2 | OXYGEN SATURATION: 96 % | HEART RATE: 76 BPM | SYSTOLIC BLOOD PRESSURE: 144 MMHG | TEMPERATURE: 98.8 F | WEIGHT: 130 LBS | HEIGHT: 64 IN | DIASTOLIC BLOOD PRESSURE: 96 MMHG

## 2025-07-26 DIAGNOSIS — J01.00 ACUTE MAXILLARY SINUSITIS, RECURRENCE NOT SPECIFIED: Primary | ICD-10-CM

## 2025-07-26 PROCEDURE — 3080F DIAST BP >= 90 MM HG: CPT | Performed by: FAMILY MEDICINE

## 2025-07-26 PROCEDURE — 99213 OFFICE O/P EST LOW 20 MIN: CPT | Performed by: FAMILY MEDICINE

## 2025-07-26 PROCEDURE — 1160F RVW MEDS BY RX/DR IN RCRD: CPT | Performed by: FAMILY MEDICINE

## 2025-07-26 PROCEDURE — 1036F TOBACCO NON-USER: CPT | Performed by: FAMILY MEDICINE

## 2025-07-26 PROCEDURE — 1159F MED LIST DOCD IN RCRD: CPT | Performed by: FAMILY MEDICINE

## 2025-07-26 PROCEDURE — 3008F BODY MASS INDEX DOCD: CPT | Performed by: FAMILY MEDICINE

## 2025-07-26 PROCEDURE — 3077F SYST BP >= 140 MM HG: CPT | Performed by: FAMILY MEDICINE

## 2025-07-26 PROCEDURE — G2211 COMPLEX E/M VISIT ADD ON: HCPCS | Performed by: FAMILY MEDICINE

## 2025-07-26 RX ORDER — DOXYCYCLINE 100 MG/1
100 CAPSULE ORAL 2 TIMES DAILY
Qty: 20 CAPSULE | Refills: 0 | Status: SHIPPED | OUTPATIENT
Start: 2025-07-26 | End: 2025-08-05

## 2025-07-26 ASSESSMENT — ENCOUNTER SYMPTOMS
COUGH: 1
FEVER: 0
SHORTNESS OF BREATH: 0
WHEEZING: 1

## 2025-07-26 NOTE — H&P (VIEW-ONLY)
"Subjective   Patient ID: Krupa Ramirez is a 74 y.o. female who presents for RECURRING COUGH / CONGESTION /SINUS PRESSURE - PAIN / NEG COVID. States she completed prednisone and Doxycycline prescribed by Dr. Swanson from her visit on 7/10/25. States her cough never fully resolved; has tried OTC Benadryl, Tylenol and Advil.     Cough for a couple weeks   A little better with   Bloody mucous yesterday   Left side of sinuses with pain       Was concerned about side effects with the quinolone  So took the doxycycline with some relief         Review of Systems   Constitutional:  Negative for fever.   HENT:  Positive for congestion.    Respiratory:  Positive for cough and wheezing. Negative for shortness of breath.        Objective   BP (!) 144/96   Pulse 76   Temp 37.1 °C (98.8 °F)   Ht 1.626 m (5' 4\")   Wt 59 kg (130 lb)   LMP  (LMP Unknown)   SpO2 96%   BMI 22.31 kg/m²     Physical Exam  Constitutional:       Appearance: Normal appearance.   HENT:      Head: Normocephalic and atraumatic.      Right Ear: Tympanic membrane and ear canal normal.      Left Ear: Tympanic membrane and ear canal normal.      Nose: No nasal deformity.      Right Sinus: No maxillary sinus tenderness or frontal sinus tenderness.      Left Sinus: Maxillary sinus tenderness present. No frontal sinus tenderness.      Mouth/Throat:      Mouth: Mucous membranes are moist. No oral lesions.      Tongue: No lesions.      Pharynx: Oropharynx is clear.     Cardiovascular:      Rate and Rhythm: Normal rate and regular rhythm.   Pulmonary:      Effort: Pulmonary effort is normal.      Breath sounds: Normal breath sounds.     Musculoskeletal:      Cervical back: No tenderness.   Lymphadenopathy:      Cervical: No cervical adenopathy.     Neurological:      Mental Status: She is alert.         Assessment/Plan   Problem List Items Addressed This Visit    None  Visit Diagnoses         Codes      Acute maxillary sinusitis, recurrence not specified    -  " Primary J01.00    Relevant Medications    doxycycline (Vibramycin) 100 mg capsule

## 2025-07-26 NOTE — PATIENT INSTRUCTIONS
You may take over-the-counter medications as needed for symptom relief.  Call the office if your symptoms worsen such as high fever and worsening cough or increase in symptoms.     Follow up if symptoms worsen or persist.    Take antibiotic with food, watch sun exposure

## 2025-08-06 ENCOUNTER — ANESTHESIA EVENT (OUTPATIENT)
Dept: OPERATING ROOM | Facility: HOSPITAL | Age: 75
End: 2025-08-06
Payer: MEDICARE

## 2025-08-08 ENCOUNTER — ANESTHESIA (OUTPATIENT)
Dept: OPERATING ROOM | Facility: HOSPITAL | Age: 75
End: 2025-08-08
Payer: MEDICARE

## 2025-08-08 ENCOUNTER — HOSPITAL ENCOUNTER (OUTPATIENT)
Dept: OPERATING ROOM | Facility: HOSPITAL | Age: 75
Setting detail: OUTPATIENT SURGERY
Discharge: HOME | End: 2025-08-08
Payer: MEDICARE

## 2025-08-08 ENCOUNTER — HOSPITAL ENCOUNTER (OUTPATIENT)
Dept: CARDIOLOGY | Facility: HOSPITAL | Age: 75
Discharge: HOME | End: 2025-08-08
Payer: MEDICARE

## 2025-08-08 VITALS
BODY MASS INDEX: 22.09 KG/M2 | WEIGHT: 129.41 LBS | DIASTOLIC BLOOD PRESSURE: 83 MMHG | HEART RATE: 72 BPM | OXYGEN SATURATION: 100 % | TEMPERATURE: 97.9 F | HEIGHT: 64 IN | RESPIRATION RATE: 15 BRPM | SYSTOLIC BLOOD PRESSURE: 138 MMHG

## 2025-08-08 DIAGNOSIS — Z12.11 ENCOUNTER FOR SCREENING FOR MALIGNANT NEOPLASM OF COLON: ICD-10-CM

## 2025-08-08 LAB
ALBUMIN SERPL BCP-MCNC: 3.7 G/DL (ref 3.4–5)
ALP SERPL-CCNC: 41 U/L (ref 33–136)
ALT SERPL W P-5'-P-CCNC: 12 U/L (ref 7–45)
ANION GAP SERPL CALC-SCNC: 12 MMOL/L (ref 10–20)
AST SERPL W P-5'-P-CCNC: 17 U/L (ref 9–39)
ATRIAL RATE: 120 BPM
BILIRUB SERPL-MCNC: 0.5 MG/DL (ref 0–1.2)
BUN SERPL-MCNC: 13 MG/DL (ref 6–23)
CALCIUM SERPL-MCNC: 8.9 MG/DL (ref 8.6–10.3)
CHLORIDE SERPL-SCNC: 96 MMOL/L (ref 98–107)
CO2 SERPL-SCNC: 27 MMOL/L (ref 21–32)
CREAT SERPL-MCNC: 0.69 MG/DL (ref 0.5–1.05)
EGFRCR SERPLBLD CKD-EPI 2021: >90 ML/MIN/1.73M*2
GLUCOSE SERPL-MCNC: 100 MG/DL (ref 74–99)
P AXIS: 43 DEGREES
P OFFSET: 185 MS
P ONSET: 134 MS
POTASSIUM SERPL-SCNC: 3.1 MMOL/L (ref 3.5–5.3)
PR INTERVAL: 178 MS
PROT SERPL-MCNC: 5.9 G/DL (ref 6.4–8.2)
Q ONSET: 223 MS
QRS COUNT: 13 BEATS
QRS DURATION: 80 MS
QT INTERVAL: 438 MS
QTC CALCULATION(BAZETT): 502 MS
QTC FREDERICIA: 480 MS
R AXIS: -22 DEGREES
SODIUM SERPL-SCNC: 132 MMOL/L (ref 136–145)
T AXIS: 32 DEGREES
T OFFSET: 442 MS
VENTRICULAR RATE: 79 BPM

## 2025-08-08 PROCEDURE — 7100000010 HC PHASE TWO TIME - EACH INCREMENTAL 1 MINUTE: Performed by: ANESTHESIOLOGY

## 2025-08-08 PROCEDURE — 7100000009 HC PHASE TWO TIME - INITIAL BASE CHARGE: Performed by: ANESTHESIOLOGY

## 2025-08-08 PROCEDURE — 93005 ELECTROCARDIOGRAM TRACING: CPT

## 2025-08-08 PROCEDURE — 3700000001 HC GENERAL ANESTHESIA TIME - INITIAL BASE CHARGE: Performed by: ANESTHESIOLOGY

## 2025-08-08 PROCEDURE — 7100000002 HC RECOVERY ROOM TIME - EACH INCREMENTAL 1 MINUTE: Performed by: ANESTHESIOLOGY

## 2025-08-08 PROCEDURE — 3600000002 HC OR TIME - INITIAL BASE CHARGE - PROCEDURE LEVEL TWO: Performed by: ANESTHESIOLOGY

## 2025-08-08 PROCEDURE — 36415 COLL VENOUS BLD VENIPUNCTURE: CPT | Performed by: ANESTHESIOLOGY

## 2025-08-08 PROCEDURE — 45380 COLONOSCOPY AND BIOPSY: CPT | Performed by: SURGERY

## 2025-08-08 PROCEDURE — 2500000004 HC RX 250 GENERAL PHARMACY W/ HCPCS (ALT 636 FOR OP/ED): Performed by: NURSE ANESTHETIST, CERTIFIED REGISTERED

## 2025-08-08 PROCEDURE — 3600000007 HC OR TIME - EACH INCREMENTAL 1 MINUTE - PROCEDURE LEVEL TWO: Performed by: ANESTHESIOLOGY

## 2025-08-08 PROCEDURE — 93005 ELECTROCARDIOGRAM TRACING: CPT | Mod: 59

## 2025-08-08 PROCEDURE — 84075 ASSAY ALKALINE PHOSPHATASE: CPT | Performed by: ANESTHESIOLOGY

## 2025-08-08 PROCEDURE — 7100000001 HC RECOVERY ROOM TIME - INITIAL BASE CHARGE: Performed by: ANESTHESIOLOGY

## 2025-08-08 PROCEDURE — 2500000004 HC RX 250 GENERAL PHARMACY W/ HCPCS (ALT 636 FOR OP/ED): Performed by: ANESTHESIOLOGY

## 2025-08-08 PROCEDURE — 3700000002 HC GENERAL ANESTHESIA TIME - EACH INCREMENTAL 1 MINUTE: Performed by: ANESTHESIOLOGY

## 2025-08-08 RX ORDER — ONDANSETRON HYDROCHLORIDE 2 MG/ML
4 INJECTION, SOLUTION INTRAVENOUS ONCE AS NEEDED
Status: DISCONTINUED | OUTPATIENT
Start: 2025-08-08 | End: 2025-08-09 | Stop reason: HOSPADM

## 2025-08-08 RX ORDER — SODIUM CHLORIDE, SODIUM LACTATE, POTASSIUM CHLORIDE, CALCIUM CHLORIDE 600; 310; 30; 20 MG/100ML; MG/100ML; MG/100ML; MG/100ML
20 INJECTION, SOLUTION INTRAVENOUS CONTINUOUS
Status: DISCONTINUED | OUTPATIENT
Start: 2025-08-08 | End: 2025-08-09 | Stop reason: HOSPADM

## 2025-08-08 RX ORDER — PROPOFOL 10 MG/ML
INJECTION, EMULSION INTRAVENOUS AS NEEDED
Status: DISCONTINUED | OUTPATIENT
Start: 2025-08-08 | End: 2025-08-08

## 2025-08-08 RX ORDER — DEXMEDETOMIDINE IN 0.9 % NACL 20 MCG/5ML
SYRINGE (ML) INTRAVENOUS AS NEEDED
Status: DISCONTINUED | OUTPATIENT
Start: 2025-08-08 | End: 2025-08-08

## 2025-08-08 RX ORDER — DROPERIDOL 2.5 MG/ML
0.62 INJECTION, SOLUTION INTRAMUSCULAR; INTRAVENOUS ONCE AS NEEDED
Status: DISCONTINUED | OUTPATIENT
Start: 2025-08-08 | End: 2025-08-09 | Stop reason: HOSPADM

## 2025-08-08 RX ADMIN — PROPOFOL 43.12 MG: 10 INJECTION, EMULSION INTRAVENOUS at 09:00

## 2025-08-08 RX ADMIN — SODIUM CHLORIDE, POTASSIUM CHLORIDE, SODIUM LACTATE AND CALCIUM CHLORIDE 20 ML/HR: 600; 310; 30; 20 INJECTION, SOLUTION INTRAVENOUS at 09:39

## 2025-08-08 RX ADMIN — Medication 8 MCG: at 09:01

## 2025-08-08 RX ADMIN — PROPOFOL 140 MCG/KG/MIN: 10 INJECTION, EMULSION INTRAVENOUS at 08:51

## 2025-08-08 RX ADMIN — SODIUM CHLORIDE, POTASSIUM CHLORIDE, SODIUM LACTATE AND CALCIUM CHLORIDE: 600; 310; 30; 20 INJECTION, SOLUTION INTRAVENOUS at 08:45

## 2025-08-08 RX ADMIN — PROPOFOL 70 MG: 10 INJECTION, EMULSION INTRAVENOUS at 08:50

## 2025-08-08 SDOH — HEALTH STABILITY: MENTAL HEALTH: CURRENT SMOKER: 0

## 2025-08-08 ASSESSMENT — COLUMBIA-SUICIDE SEVERITY RATING SCALE - C-SSRS
1. IN THE PAST MONTH, HAVE YOU WISHED YOU WERE DEAD OR WISHED YOU COULD GO TO SLEEP AND NOT WAKE UP?: NO
6. HAVE YOU EVER DONE ANYTHING, STARTED TO DO ANYTHING, OR PREPARED TO DO ANYTHING TO END YOUR LIFE?: NO
2. HAVE YOU ACTUALLY HAD ANY THOUGHTS OF KILLING YOURSELF?: NO

## 2025-08-08 ASSESSMENT — PAIN - FUNCTIONAL ASSESSMENT: PAIN_FUNCTIONAL_ASSESSMENT: 0-10

## 2025-08-08 ASSESSMENT — PAIN SCALES - GENERAL
PAINLEVEL_OUTOF10: 0 - NO PAIN
PAINLEVEL_OUTOF10: 0 - NO PAIN
PAIN_LEVEL: 2
PAINLEVEL_OUTOF10: 0 - NO PAIN

## 2025-08-08 NOTE — ANESTHESIA POSTPROCEDURE EVALUATION
Patient: Krupa Ramirez    Procedure Summary       Date: 08/08/25 Room / Location: [facility] OR    Anesthesia Start: 0846 Anesthesia Stop: 0913    Procedure: COLONOSCOPY Diagnosis: Encounter for screening for malignant neoplasm of colon    Scheduled Providers: Cherelle Sheehan MD; Malcolm Gillespie MD; Leonel Martins RN; Leah Berardinelli; GOSIA Tucker-CRNA Responsible Provider: Malcolm Gillespie MD    Anesthesia Type: general ASA Status: 3            Anesthesia Type: general    Vitals Value Taken Time   /64 08/08/25 09:39   Temp 36.6 °C (97.9 °F) 08/08/25 09:09   Pulse 76 08/08/25 09:39   Resp 15 08/08/25 09:39   SpO2 99 % 08/08/25 09:39       Anesthesia Post Evaluation    Patient location during evaluation: PACU  Patient participation: complete - patient participated  Level of consciousness: awake  Pain score: 2  Pain management: adequate  Multimodal analgesia pain management approach  Airway patency: patent  Two or more strategies used to mitigate risk of obstructive sleep apnea  Cardiovascular status: acceptable  Respiratory status: acceptable  Hydration status: acceptable  Postoperative Nausea and Vomiting: none        No notable events documented.

## 2025-08-08 NOTE — ANESTHESIA PREPROCEDURE EVALUATION
Patient: Krupa Ramirez    Procedure Information       Anesthesia Start Date/Time: 08/08/25 0846    Scheduled providers: Cherelle Sheehan MD; Malcolm Gillespie MD; Leonel Martins RN; Leah Berardinelli; GOSIA Tucker-CRNA    Procedure: COLONOSCOPY    Location: [facility] OR            Relevant Problems   Cardiac   (+) Abnormal electrocardiography   (+) Combined hyperlipidemia   (+) Essential hypertension       Clinical information reviewed:   Tobacco  Allergies  Meds   Med Hx  Surg Hx   Fam Hx  Soc Hx        NPO Detail:  NPO/Void Status  Date of Last Liquid: 08/08/25  Time of Last Liquid: 0130  Date of Last Solid: 08/06/25         Physical Exam    Airway  Mallampati: II     Cardiovascular - normal exam   Dental     (+) upper dentures     Pulmonary - normal exam   Abdominal            Anesthesia Plan    History of general anesthesia?: yes  History of complications of general anesthesia?: no    ASA 3     general     The patient is not a current smoker.    Anesthetic plan and risks discussed with patient.    Plan discussed with attending.

## 2025-08-08 NOTE — DISCHARGE INSTRUCTIONS
Patient Instructions after a Colonoscopy      The anesthetics, sedatives or narcotics which were given to you today will be acting in your body for the next 24 hours, so you might feel a little sleepy or groggy.  This feeling should slowly wear off. Carefully read and follow the instructions.     You received sedation today:  - Do not drive or operate any machinery or power tools of any kind.   - No alcoholic beverages today, not even beer or wine.  - Do not make any important decisions or sign any legal documents.  - No over the counter medications that contain alcohol or that may cause drowsiness.  - Do not make any important decisions or sign any legal documents.  - Make sure you have someone with you for first 24 hours.    While it is common to experience mild to moderate abdominal distention, gas, or belching after your procedure, if any of these symptoms occur following discharge from the GI Lab or within one week of having your procedure, call the [facility] to be advised whether a visit to your nearest Urgent Care or Emergency Department is indicated.  Take this paper with you if you go.     - If you develop an allergic reaction to the medications that were given during your procedure such as difficulty breathing, rash, hives, severe nausea, vomiting or lightheadedness.  - If you experience chest pain, shortness of breath, severe abdominal pain, fevers and chills.  -If you develop signs and symptoms of bleeding such as blood in your spit, if your stools turn black, tarry, or bloody  - If you have not urinated within 8 hours following your procedure.  - If your IV site becomes painful, red, inflamed, or looks infected.    If you received a biopsy/polypectomy/sphincterotomy the following instructions apply below:    __ Do not use Aspirin containing products, non-steroidal medications or anti-coagulants for one week following your procedure. (Examples of these types of medications are: Advil,  Arthrotec, Aleve, Coumadin, Ecotrin, Heparin, Ibuprofen, Indocin, Motrin, Naprosyn, Nuprin, Plavix, Vioxx, and Voltarin, or their generic forms.  This list is not all-inclusive.  Check with your physician or pharmacist before resuming medications.)   __ Eat a soft diet today.  Avoid foods that are poorly digested for the next 24 hours.  These foods would include: nuts, beans, lettuce, red meats, and fried foods. Start with liquids and advance your diet as tolerated, gradually work up to eating solids.   __ Do not have a Barium Study or Enema for one week.    Your physician recommends the additional following instructions:    -You have a contact number available for emergencies. The signs and symptoms of potential delayed complications were discussed with you. You may return to normal activities tomorrow.  -Resume your previous diet.  -Continue your present medications.   -We are waiting for your pathology results.  -Your physician has recommended a repeat colonoscopy (date to be determined after pending pathology results are reviewed) for surveillance based on pathology results.  -The findings and recommendations have been discussed with you.  -The findings and recommendations were discussed with your family.  - Please see Medication Reconciliation Form for new medication/medications prescribed.       If you experience any problems or have any questions following discharge from the GI Lab, please call:        Nurse Signature                                                                        Date___________________                                                                            Patient/Responsible Party Signature                                        Date___________________

## 2025-08-08 NOTE — ADDENDUM NOTE
Addendum  created 08/08/25 1414 by Malcolm Gillespie MD    Attestation recorded in Intraprocedure, Intraprocedure Attestations filed

## 2025-08-11 LAB
ATRIAL RATE: 74 BPM
P AXIS: 20 DEGREES
P OFFSET: 183 MS
P ONSET: 149 MS
PR INTERVAL: 152 MS
Q ONSET: 225 MS
QRS COUNT: 12 BEATS
QRS DURATION: 76 MS
QT INTERVAL: 436 MS
QTC CALCULATION(BAZETT): 483 MS
QTC FREDERICIA: 467 MS
R AXIS: -20 DEGREES
T AXIS: 23 DEGREES
T OFFSET: 443 MS
VENTRICULAR RATE: 74 BPM

## 2025-08-15 LAB
LABORATORY COMMENT REPORT: NORMAL
PATH REPORT.FINAL DX SPEC: NORMAL
PATH REPORT.GROSS SPEC: NORMAL
PATH REPORT.MICROSCOPIC SPEC OTHER STN: NORMAL
PATH REPORT.RELEVANT HX SPEC: NORMAL
PATH REPORT.TOTAL CANCER: NORMAL

## 2025-08-21 ENCOUNTER — OFFICE VISIT (OUTPATIENT)
Dept: CARDIOLOGY | Facility: HOSPITAL | Age: 75
End: 2025-08-21
Payer: MEDICARE

## 2025-08-21 VITALS
DIASTOLIC BLOOD PRESSURE: 102 MMHG | WEIGHT: 133.6 LBS | SYSTOLIC BLOOD PRESSURE: 170 MMHG | HEART RATE: 78 BPM | OXYGEN SATURATION: 98 % | BODY MASS INDEX: 22.93 KG/M2

## 2025-08-21 DIAGNOSIS — R94.31 ABNORMAL EKG: Primary | ICD-10-CM

## 2025-08-21 LAB
ATRIAL RATE: 75 BPM
P AXIS: 71 DEGREES
P OFFSET: 191 MS
P ONSET: 137 MS
PR INTERVAL: 180 MS
Q ONSET: 227 MS
QRS COUNT: 12 BEATS
QRS DURATION: 72 MS
QT INTERVAL: 404 MS
QTC CALCULATION(BAZETT): 451 MS
QTC FREDERICIA: 434 MS
R AXIS: -31 DEGREES
T AXIS: 35 DEGREES
T OFFSET: 429 MS
VENTRICULAR RATE: 75 BPM

## 2025-08-21 PROCEDURE — 1159F MED LIST DOCD IN RCRD: CPT | Performed by: PHYSICIAN ASSISTANT

## 2025-08-21 PROCEDURE — 99204 OFFICE O/P NEW MOD 45 MIN: CPT | Performed by: PHYSICIAN ASSISTANT

## 2025-08-21 PROCEDURE — 93005 ELECTROCARDIOGRAM TRACING: CPT | Performed by: PHYSICIAN ASSISTANT

## 2025-08-21 PROCEDURE — 99212 OFFICE O/P EST SF 10 MIN: CPT

## 2025-08-21 PROCEDURE — 3077F SYST BP >= 140 MM HG: CPT | Performed by: PHYSICIAN ASSISTANT

## 2025-08-21 PROCEDURE — 3080F DIAST BP >= 90 MM HG: CPT | Performed by: PHYSICIAN ASSISTANT
